# Patient Record
Sex: MALE | Race: ASIAN | Employment: PART TIME | URBAN - METROPOLITAN AREA
[De-identification: names, ages, dates, MRNs, and addresses within clinical notes are randomized per-mention and may not be internally consistent; named-entity substitution may affect disease eponyms.]

---

## 2019-02-07 ENCOUNTER — APPOINTMENT (OUTPATIENT)
Dept: GENERAL RADIOLOGY | Age: 24
End: 2019-02-07
Payer: COMMERCIAL

## 2019-02-07 ENCOUNTER — HOSPITAL ENCOUNTER (EMERGENCY)
Age: 24
Discharge: HOME | End: 2019-02-07
Payer: COMMERCIAL

## 2019-02-07 VITALS
TEMPERATURE: 97.8 F | HEART RATE: 79 BPM | DIASTOLIC BLOOD PRESSURE: 97 MMHG | HEIGHT: 65 IN | WEIGHT: 158 LBS | BODY MASS INDEX: 26.33 KG/M2 | SYSTOLIC BLOOD PRESSURE: 144 MMHG | OXYGEN SATURATION: 97 % | RESPIRATION RATE: 18 BRPM

## 2019-02-07 DIAGNOSIS — S82.301A CLOSED FRACTURE OF DISTAL END OF RIGHT TIBIA, UNSPECIFIED FRACTURE MORPHOLOGY, INITIAL ENCOUNTER: Primary | ICD-10-CM

## 2019-02-07 PROCEDURE — 73610 X-RAY EXAM OF ANKLE: CPT

## 2019-02-07 PROCEDURE — 4500000023 HC ED LEVEL 3 PROCEDURE

## 2019-02-07 PROCEDURE — 99283 EMERGENCY DEPT VISIT LOW MDM: CPT

## 2019-02-07 PROCEDURE — 6370000000 HC RX 637 (ALT 250 FOR IP): Performed by: NURSE PRACTITIONER

## 2019-02-07 RX ORDER — IBUPROFEN 800 MG/1
800 TABLET ORAL EVERY 8 HOURS PRN
Qty: 20 TABLET | Refills: 0 | Status: SHIPPED | OUTPATIENT
Start: 2019-02-07 | End: 2019-08-20

## 2019-02-07 RX ORDER — IBUPROFEN 400 MG/1
400 TABLET ORAL ONCE
Status: COMPLETED | OUTPATIENT
Start: 2019-02-07 | End: 2019-02-07

## 2019-02-07 RX ORDER — HYDROCODONE BITARTRATE AND ACETAMINOPHEN 5; 325 MG/1; MG/1
1 TABLET ORAL EVERY 6 HOURS PRN
Qty: 10 TABLET | Refills: 0 | Status: SHIPPED | OUTPATIENT
Start: 2019-02-07 | End: 2019-02-10

## 2019-02-07 RX ADMIN — IBUPROFEN 400 MG: 400 TABLET, FILM COATED ORAL at 12:59

## 2019-02-07 ASSESSMENT — PAIN DESCRIPTION - LOCATION: LOCATION: ANKLE

## 2019-02-07 ASSESSMENT — PAIN SCALES - GENERAL
PAINLEVEL_OUTOF10: 9
PAINLEVEL_OUTOF10: 8

## 2019-02-07 ASSESSMENT — ENCOUNTER SYMPTOMS
NAUSEA: 0
ABDOMINAL PAIN: 0
CHEST TIGHTNESS: 0
DIARRHEA: 0
VOMITING: 0
SHORTNESS OF BREATH: 0

## 2019-02-07 ASSESSMENT — PAIN DESCRIPTION - ORIENTATION: ORIENTATION: LEFT

## 2019-02-07 ASSESSMENT — PAIN DESCRIPTION - PAIN TYPE: TYPE: ACUTE PAIN

## 2019-02-12 ENCOUNTER — OFFICE VISIT (OUTPATIENT)
Dept: ORTHOPEDIC SURGERY | Age: 24
End: 2019-02-12
Payer: COMMERCIAL

## 2019-02-12 VITALS
BODY MASS INDEX: 26.33 KG/M2 | DIASTOLIC BLOOD PRESSURE: 92 MMHG | SYSTOLIC BLOOD PRESSURE: 153 MMHG | WEIGHT: 158 LBS | HEART RATE: 127 BPM | HEIGHT: 65 IN | RESPIRATION RATE: 16 BRPM

## 2019-02-12 DIAGNOSIS — S82.392A CLOSED FRACTURE OF POSTERIOR MALLEOLUS OF LEFT TIBIA, INITIAL ENCOUNTER: Primary | ICD-10-CM

## 2019-02-12 PROCEDURE — L4360 PNEUMAT WALKING BOOT PRE CST: HCPCS | Performed by: ORTHOPAEDIC SURGERY

## 2019-02-12 PROCEDURE — 99203 OFFICE O/P NEW LOW 30 MIN: CPT | Performed by: ORTHOPAEDIC SURGERY

## 2019-02-12 PROCEDURE — 27824 TREAT LOWER LEG FRACTURE: CPT | Performed by: ORTHOPAEDIC SURGERY

## 2019-02-13 ENCOUNTER — TELEPHONE (OUTPATIENT)
Dept: ORTHOPEDIC SURGERY | Age: 24
End: 2019-02-13

## 2019-03-26 ENCOUNTER — OFFICE VISIT (OUTPATIENT)
Dept: ORTHOPEDIC SURGERY | Age: 24
End: 2019-03-26

## 2019-03-26 VITALS — WEIGHT: 158 LBS | HEIGHT: 65 IN | BODY MASS INDEX: 26.33 KG/M2

## 2019-03-26 DIAGNOSIS — S82.392A CLOSED FRACTURE OF POSTERIOR MALLEOLUS OF LEFT TIBIA, INITIAL ENCOUNTER: Primary | ICD-10-CM

## 2019-03-26 PROCEDURE — APPNB15 APP NON BILLABLE TIME 0-15 MINS: Performed by: NURSE PRACTITIONER

## 2019-03-26 PROCEDURE — 99024 POSTOP FOLLOW-UP VISIT: CPT | Performed by: ORTHOPAEDIC SURGERY

## 2019-06-10 ENCOUNTER — TELEPHONE (OUTPATIENT)
Dept: ORTHOPEDIC SURGERY | Age: 24
End: 2019-06-10

## 2019-06-10 NOTE — TELEPHONE ENCOUNTER
Patient is going back to work Tuesday and would like a work note. Would like to pickup at [facility] on Tuesday morning 6/11/19. Patient can be reached at [phone].

## 2019-06-10 NOTE — LETTER
[facility] [address] [address]  Phone: [phone]  Fax: [phone]    Soraya Cruz MD        Ambika 10, 2019     Patient: Aina Guevara   YOB: 1995   Date of Visit: 6/10/2019       To Whom It May Concern: It is my medical opinion that Ania Guevara may return to work on 06/11/2019. If you have any questions or concerns, please don't hesitate to call.     Sincerely,    Soraya Cruz MD

## 2019-06-18 ENCOUNTER — OFFICE VISIT (OUTPATIENT)
Dept: ORTHOPEDIC SURGERY | Age: 24
End: 2019-06-18
Payer: COMMERCIAL

## 2019-06-18 VITALS
SYSTOLIC BLOOD PRESSURE: 137 MMHG | HEART RATE: 84 BPM | BODY MASS INDEX: 26.33 KG/M2 | RESPIRATION RATE: 16 BRPM | WEIGHT: 158 LBS | DIASTOLIC BLOOD PRESSURE: 89 MMHG | HEIGHT: 65 IN

## 2019-06-18 DIAGNOSIS — S82.392A CLOSED FRACTURE OF POSTERIOR MALLEOLUS OF LEFT TIBIA, INITIAL ENCOUNTER: Primary | ICD-10-CM

## 2019-06-18 PROCEDURE — 1036F TOBACCO NON-USER: CPT | Performed by: ORTHOPAEDIC SURGERY

## 2019-06-18 PROCEDURE — 99213 OFFICE O/P EST LOW 20 MIN: CPT | Performed by: ORTHOPAEDIC SURGERY

## 2019-06-18 PROCEDURE — G8427 DOCREV CUR MEDS BY ELIG CLIN: HCPCS | Performed by: ORTHOPAEDIC SURGERY

## 2019-06-18 PROCEDURE — G8419 CALC BMI OUT NRM PARAM NOF/U: HCPCS | Performed by: ORTHOPAEDIC SURGERY

## 2019-06-18 NOTE — PROGRESS NOTES
CHIEF COMPLAINT: Left ankle pain / posterior tibial fracture. DATE OF INJURY: 2/6/2019, DOT: 2/12/2019    HISTORY:  Shayla Lesch 20 y.o. male presents today for follow up visit for evaluation of a left ankle injury which occurred when he was walking and slipped on the ice down some stairs. He was able to bear weight but with difficulty. He was first seen and evaluated in [facility] ER, where he was x-rayed, splinted and asked to f/u with Orthopedics. He has been weightbearing rates pain a 4/10 VAS only when he bears weight. Otherwise he denies any pain. Rates pain a 0/10 VAS. No numbness or tingling sensation. No other complaint. He works as a . History reviewed. No pertinent past medical history. History reviewed. No pertinent surgical history.     Social History     Socioeconomic History    Marital status: Single     Spouse name: Not on file    Number of children: Not on file    Years of education: Not on file    Highest education level: Not on file   Occupational History    Not on file   Social Needs    Financial resource strain: Not on file    Food insecurity:     Worry: Not on file     Inability: Not on file    Transportation needs:     Medical: Not on file     Non-medical: Not on file   Tobacco Use    Smoking status: Never Smoker    Smokeless tobacco: Never Used   Substance and Sexual Activity    Alcohol use: Yes     Comment: occ    Drug use: No    Sexual activity: Not on file   Lifestyle    Physical activity:     Days per week: Not on file     Minutes per session: Not on file    Stress: Not on file   Relationships    Social connections:     Talks on phone: Not on file     Gets together: Not on file     Attends Yarsanism service: Not on file     Active member of club or organization: Not on file     Attends meetings of clubs or organizations: Not on file     Relationship status: Not on file    Intimate partner violence:     Fear of current or ex partner: Not on file     Emotionally abused: Not on file     Physically abused: Not on file     Forced sexual activity: Not on file   Other Topics Concern    Not on file   Social History Narrative    Not on file       History reviewed. No pertinent family history. Current Outpatient Medications on File Prior to Visit   Medication Sig Dispense Refill    ibuprofen (ADVIL;MOTRIN) 800 MG tablet Take 1 tablet by mouth every 8 hours as needed for Pain or Fever 20 tablet 0    predniSONE (DELTASONE) 10 MG tablet Take 10 mg by mouth      amoxicillin-clavulanate (AUGMENTIN) 875-125 MG per tablet Take 1 tablet by mouth 2 times daily      Phenylephrine-DM-GG (MUCINEX FAST-MAX CONGEST COUGH) 5- MG TABS Take by mouth      albuterol sulfate HFA (PROVENTIL HFA) 108 (90 Base) MCG/ACT inhaler Inhale 2 puffs into the lungs every 4 hours as needed for Wheezing or Shortness of Breath (Space out to every 6 hours as symptoms improve) Space out to every 6 hours as symptoms improve. 1 Inhaler 0     No current facility-administered medications on file prior to visit. Pertinent items are noted in HPI  Review of systems reviewed from Patient History Form dated on 2/12/2019 and available in the patient's chart under the Media tab. No change noted. PHYSICAL EXAMINATION:  Mr. Justino Esparza is a very pleasant 20 y.o. male who presents today in no acute distress, awake, alert, and oriented. He is well dressed, nourished and  groomed. Patient with normal affect. Height is  5' 5\" (1.651 m), weight is 158 lb (71.7 kg), Body mass index is 26.29 kg/m². Resting respiratory rate is 16. Examination of the gait, showed that the patient walks WB left leg with no limp. Examination of both ankles showing a good range of motion of the left ankle compare to the other side. There is no swelling that can be seen, no ecchymosis. He has intact sensation and good pedal pulses.   He has no tenderness on deep palpation over the lateral and posterior left ankle. IMAGING: Xrays, 3 views of the left ankle dated today,  were reviewed, and showed a minimally displaced posterior tibial fracture. IMPRESSION: Left ankle minimally displaced posterior tibial ankle fracture. PLAN:  He can go back to normal activity with no restrictions. I discussed with the patient that I think that he would really benefit from a course of physical therapy for further strengthening and stretching. An Rx for physical therapy was given to the patient. He will be seen in 3 months and will get new Xray at that time. I told the patient that it is not unusual to have some achy pain and swelling for up to a year after a fracture.       Oscar Moss MD

## 2019-06-19 ENCOUNTER — HOSPITAL ENCOUNTER (OUTPATIENT)
Dept: PHYSICAL THERAPY | Age: 24
Setting detail: THERAPIES SERIES
Discharge: HOME | End: 2019-06-19
Payer: COMMERCIAL

## 2019-06-19 PROCEDURE — 97112 NEUROMUSCULAR REEDUCATION: CPT

## 2019-06-19 PROCEDURE — 97161 PT EVAL LOW COMPLEX 20 MIN: CPT

## 2019-06-19 PROCEDURE — 97110 THERAPEUTIC EXERCISES: CPT

## 2019-06-19 NOTE — PLAN OF CARE
deliveries. He comments that his pain is located around the 3-5th metatarsals on the left foot, and ankle pain becomes more intense when he plantarflexes his ankle and great toe simultaneously. Overall, he is getting better. His goal is to get back to normal work tasks and normal activities. Relevant Medical History: HTN, neck surgery 3/13/19  Functional Outcome: LEFS = 54/80 = 33% dis    Pain Scale: 2-5/10  Easing factors: rest  Provocative factors: walking, stairs (down), prolonged standing (10-20 min)    Type: []Constant   [x]Intermittent  []Radiating []Localized []other:     Numbness/Tingling: No    Occupation/School: Delivery    Living Status/Prior Level of Function: Prior to this injury / incident, pt was independent with ADLs and IADLs. OBJECTIVE:   Posture: Fair    Functional Mobility/Transfers: Independent    Inspection: patient able to toe walk and heel walk; toe walk increases pain in 3-5th metatarsals on L foot    Palpation: +1 TTP 3rd-5th metatarsals, dorsal aspect    Bandages/Dressings/Incisions: N/A    Gait: (include devices/WB status): normalized gait; increased toe out bilaterally.  Patient comments he feels pain in left mid/forefoot during midstance and push-off    Dermatomes Normal Abnormal Comments   inguinal area (L1)  X     anterior mid-thigh (L2) X     distal ant thigh/med knee (L3) X     medial lower leg and foot (L4) X     lateral lower leg and foot (L5) X     posterior calf (S1) X     medial calcaneus (S2) X         Myotomes Normal Abnormal Comments   Hip flexion (L1-L2) X     Knee extension (L2-L4) X     Dorsiflexion (L4-L5) X     Great Toe Ext (L5) X     Ankle Eversion (S1-S2) X     Ankle PF(S1-S2) X         Reflexes Normal Abnormal Comments   S1-2 Seated achilles      S1-2 Prone knee bend      L3-4 Patellar tendon      Clonus      Babinski           PROM AROM    L R L R   Hip Flexion       Hip Abduction       Hip ER       Hip IR       Knee Flexion   135 134   Knee Extension   0 0 Dorsiflexion (long sitting)    Prone DF   14      10 20      15   Plantarflexion    35 44   Inversion    35 40   Eversion    15 22       Strength (0-5) Left Right   Hip Flexion - supine     Hip Flexion - seated 4+ 4+   Hip Abduction     Hip Adduction     Hip Extension     Hip ER 4 4+   Hip IR 4 4+   Quads 4+ 4+   Hamstrings 4+ 4+   Ankle Dorsiflexion 5 5   Ankle Plantarflexion 4 4+   Ankle Inversion 4 4+   Ankle Eversion 4+ 4+        Flexibility     Hamstrings (90/90)     ITB Mardeen Hun)     Quads (Ely's)     Hip Flexor Maryjoshua Smith)          Girth (cm)     Mid patella     Suprapatellar     Figure 8     Transmalleolar     Metatarsal Heads         Orthopaedic Special Tests  Positive  Negative  NT Comments    Hip       PIPO / Rishabh's       FADIR       Scour              Knee       Lachman's / Anterior Drawer       Posterior Drawer       Varus Stress       Valgus Stress       Jed's        Appley's       Thessaly's              Ankle       Anterior Drawer  X     Talar Tilt       Seymour       Dank's   X              Joint mobility: Ankle mortise   [x]Normal    []Hypo   []Hyper    Balance: NBOS = 10sec, Tandem R/L each = 10sec, SLS R = 10sec, SLS L = 4sec (unsteadiness)                       [x] Patient history, allergies, meds reviewed. Medical chart reviewed. See intake form. Review Of Systems (ROS):  [x]Performed Review of systems (Integumentary, CardioPulmonary, Neurological) by intake and observation. Intake form has been scanned into medical record. Patient has been instructed to contact their primary care physician regarding ROS issues if not already being addressed at this time.       Co-morbidities/Complexities (which will affect course of rehabilitation):   []None           Arthritic conditions   []Rheumatoid arthritis (M05.9)  []Osteoarthritis (M19.91)   Cardiovascular conditions   []Hypertension (I10)  []Hyperlipidemia (E78.5)  []Angina pectoris (I20)  []Atherosclerosis (I70)   Musculoskeletal conditions []Disc pathology   []Congenital spine pathologies   []Prior surgical intervention  []Osteoporosis (M81.8)  []Osteopenia (M85.8)   Endocrine conditions   []Hypothyroid (E03.9)  []Hyperthyroid Gastrointestinal conditions   []Constipation (K00.85)   Metabolic conditions   []Morbid obesity (E66.01)  []Diabetes type 1(E10.65) or 2 (E11.65)   []Neuropathy (G60.9)     Pulmonary conditions   []Asthma (J45)  []Coughing   []COPD (J44.9)   Psychological Disorders  []Anxiety (F41.9)  []Depression (F32.9)   []Other:   []Other:          Barriers to/and or personal factors that will affect rehab potential:              [x]Age  [x]Sex    []Smoker              [x]Motivation/Lack of Motivation                        []Co-Morbidities              []Cognitive Function, education/learning barriers              []Environmental, home barriers              []profession/work barriers  []past PT/medical experience  []other:  Justification: positive influence on rehab    Falls Risk Assessment (30 days): despite SANDRA,  [x] Falls Risk assessed and no intervention required    ASSESSMENT:   Functional Impairments:     [x]Noted lumbar/distal hip/LE joint hypomobility   [x]Decreased LE functional ROM   [x]Decreased core/distal hip strength and neuromuscular control   [x]Decreased LE functional strength   [x]Reduced balance/proprioceptive control   []other:      Functional Activity Limitations (from functional questionnaire and intake)   [x]Reduced ability to tolerate prolonged functional positions   []Reduced ability or difficulty with changes of positions or transfers between positions   []Reduced ability to maintain good posture and demonstrate good body mechanics with sitting, bending, and lifting   []Reduced ability to sleep   [] Reduced ability or tolerance with driving and/or computer work   []Reduced ability to perform lifting, carrying tasks   []Reduced ability to squat   []Reduced ability to forward bend   [x]Reduced ability to ambulate prolonged functional periods/distances/surfaces   [x]Reduced ability to ascend/descend stairs   [x]Reduced ability to run, hop, cut or jump   []other:    Participation Restrictions   []Reduced participation in self care activities   [x]Reduced participation in home management activities   [x]Reduced participation in work activities   [x]Reduced participation in social activities. [x]Reduced participation in sport/recreation activities. Classification :    []Signs/symptoms consistent with post-surgical status including decreased ROM, strength and function.    [x]Signs/symptoms consistent with joint sprain/strain/closed fracture   []Signs/symptoms consistent with patella-femoral syndrome   []Signs/symptoms consistent with knee OA/hip OA   []Signs/symptoms consistent with internal derangement of knee/Hip   []Signs/symptoms consistent with functional hip weakness/NMR control      []Signs/symptoms consistent with tendinitis/tendinosis    []signs/symptoms consistent with pathology which may benefit from Dry needling      []other:      Prognosis/Rehab Potential:      [x]Excellent   []Good    []Fair   []Poor    Tolerance of evaluation/treatment:    [x]Excellent   []Good    []Fair   []Poor    Physical Therapy Evaluation Complexity Justification  [x] A history of present problem with:  [] no personal factors and/or comorbidities that impact the plan of care;  [x]1-2 personal factors and/or comorbidities that impact the plan of care  []3 personal factors and/or comorbidities that impact the plan of care  [x] An examination of body systems using standardized tests and measures addressing any of the following: body structures and functions (impairments), activity limitations, and/or participation restrictions;:  [] a total of 1-2 or more elements   [x] a total of 3 or more elements   [] a total of 4 or more elements   [x] A clinical presentation with:  [x] stable and/or uncomplicated characteristics   [] evolving clinical presentation with changing characteristics  [] unstable and unpredictable characteristics;   [x] Clinical decision making of [x] low , [] moderate, [] high complexity using standardized patient assessment instrument and/or measurable assessment of functional outcome. [x] EVAL (LOW) 47527 (typically 15 minutes face-to-face)  [] EVAL (MOD) 29913 (typically 30 minutes face-to-face)  [] EVAL (HIGH) 99183 (typically 45 minutes face-to-face)  [] RE-EVAL     PLAN:   Frequency/Duration:  2 days per week for 4 Weeks:  Interventions:  [x]  Therapeutic exercise including: strength training, ROM, for Lower extremity and core   [x]  NMR activation and proprioception for LE, Glutes and Core   [x]  Manual therapy as indicated for LE, Hip and spine to include: Dry Needling/IASTM, STM, PROM, Gr I-IV mobilizations, manipulation. [x] Modalities as needed that may include: thermal agents, E-stim, Biofeedback, US, iontophoresis as indicated  [x] Patient education on joint protection, postural re-education, activity modification, progression of HEP. HEP instruction: The following exercises were performed and added to the patient's home exercise program. (ankle 4 way w/ TB, standing runner's S, standing soleus S, standing HR, toe curls, SLS LLE). Additionally, the patient was educated on appropriate frequency, intensity and duration. Handout provided    GOALS:  Patient stated goal: to return to normalized walking, driving, work tasks    Therapist goals for Patient:   Short Term Goals: To be achieved in: 2 weeks  1. Independent in HEP and progression per patient tolerance, in order to prevent re-injury. 2. Patient will have a decrease in pain to <1/10 facilitate improvement in movement, function, and ADLs as indicated by Functional Deficits. Long Term Goals: To be achieved in: 4 weeks  1. Disability index score of 15% or less for the LEFS to assist with reaching prior level of function.    2. Patient will demonstrate increased AROM to 15deg prone, 20deg long sitting to allow for proper joint functioning as indicated by patients Functional Deficits. 3. Patient will demonstrate an increase in ankle strength to 5/5 as well as control to allow for proper functional mobility as indicated by patients Functional Deficits. 4. Patient will return to functional activities including stair navigation without increased symptoms or restriction. 5. The patient will perform 15 squats >60deg in depth with proper form and control in order to facilitate return to PLOF in functional mobility.     Electronically signed by:  Mitali Engle, PT, DPT

## 2019-06-19 NOTE — FLOWSHEET NOTE
ambulation. [x] (24489) Provided verbal/tactile cueing for activities related to improving balance, coordination, kinesthetic sense, posture, motor skill, proprioception  to assist with LE, proximal hip, and core control in self care, mobility, lifting, ambulation and eccentric single leg control. NMR and Therapeutic Activities:    [x] (15071 or 70913) Provided verbal/tactile cueing for activities related to improving balance, coordination, kinesthetic sense, posture, motor skill, proprioception and motor activation to allow for proper function of core, proximal hip and LE with self care and ADLs  [] (86488) Gait Re-education- Provided training and instruction to the patient for proper LE, core and proximal hip recruitment and positioning and eccentric body weight control with ambulation re-education including up and down stairs     Home Exercise Program:    [x] (74652) Reviewed/Progressed HEP activities related to strengthening, flexibility, endurance, ROM of core, proximal hip and LE for functional self-care, mobility, lifting and ambulation/stair navigation   [] (02134)Reviewed/Progressed HEP activities related to improving balance, coordination, kinesthetic sense, posture, motor skill, proprioception of core, proximal hip and LE for self care, mobility, lifting, and ambulation/stair navigation      Manual Treatments:  PROM / STM / Oscillations-Mobs:  G-I, II, III, IV (PA's, Inf., Post.)  [] (15764) Provided manual therapy to mobilize LE, proximal hip and/or LS spine soft tissue/joints for the purpose of modulating pain, promoting relaxation,  increasing ROM, reducing/eliminating soft tissue swelling/inflammation/restriction, improving soft tissue extensibility and allowing for proper ROM for normal function with self care, mobility, lifting and ambulation.      Modalities:  [] (60825) Vasopneumatic compression: Utilized vasopneumatic compression to decrease edema / swelling for the purpose of improving mobility and quad tone / recruitment which will allow for increased overall function including but not limited to self-care, transfers, ambulation, and ascending / descending stairs. Modalities:  [place] [facility] [place]    Charges:  Timed Code Treatment Minutes: 30   Total Treatment Minutes: 50     [x] EVAL - LOW (86708)   [] EVAL - MOD (75789)  [] EVAL - HIGH (54522)  [] RE-EVAL (19954)  [x] JX(22064) x 1      [] Ionto  [x] NMR (83607) x 1    [] Vaso  [] Manual (12245) x      [] Ultrasound  [] TA x       [] Mech Traction (90431)  [] Aquatic Therapy x     [] ES (un) (85791):   [] Home Management Training x [] ES(attended) (92445)   [] Group:     [] Other:     GOALS: Patient stated goal: to return to normalized walking, driving, work tasks    Therapist goals for Patient:   Short Term Goals: To be achieved in: 2 weeks  1. Independent in HEP and progression per patient tolerance, in order to prevent re-injury. 2. Patient will have a decrease in pain to <1/10 facilitate improvement in movement, function, and ADLs as indicated by Functional Deficits. Long Term Goals: To be achieved in: 4 weeks  1. Disability index score of 15% or less for the LEFS to assist with reaching prior level of function. 2. Patient will demonstrate increased AROM to 15deg prone, 20deg long sitting to allow for proper joint functioning as indicated by patients Functional Deficits. 3. Patient will demonstrate an increase in ankle strength to 5/5 as well as control to allow for proper functional mobility as indicated by patients Functional Deficits. 4. Patient will return to functional activities including stair navigation without increased symptoms or restriction. 5. The patient will perform 15 squats >60deg in depth with proper form and control in order to facilitate return to PLOF in functional mobility. Progression Towards Functional goals:  [] Patient is progressing as expected towards functional goals listed.     []

## 2019-06-26 ENCOUNTER — APPOINTMENT (OUTPATIENT)
Dept: PHYSICAL THERAPY | Age: 24
End: 2019-06-26
Payer: COMMERCIAL

## 2019-06-28 ENCOUNTER — HOSPITAL ENCOUNTER (OUTPATIENT)
Dept: PHYSICAL THERAPY | Age: 24
Setting detail: THERAPIES SERIES
Discharge: HOME | End: 2019-06-28
Payer: COMMERCIAL

## 2019-06-28 PROCEDURE — 97110 THERAPEUTIC EXERCISES: CPT

## 2019-06-28 PROCEDURE — 97140 MANUAL THERAPY 1/> REGIONS: CPT

## 2019-06-28 NOTE — FLOWSHEET NOTE
Post.)  [x] (22601) Provided manual therapy to mobilize LE, proximal hip and/or LS spine soft tissue/joints for the purpose of modulating pain, promoting relaxation,  increasing ROM, reducing/eliminating soft tissue swelling/inflammation/restriction, improving soft tissue extensibility and allowing for proper ROM for normal function with self care, mobility, lifting and ambulation. Modalities:  [] (33617) Vasopneumatic compression: Utilized vasopneumatic compression to decrease edema / swelling for the purpose of improving mobility and quad tone / recruitment which will allow for increased overall function including but not limited to self-care, transfers, ambulation, and ascending / descending stairs. Modalities:  [place] [facility] [place]    Charges:  Timed Code Treatment Minutes: 30   Total Treatment Minutes: 30     [] EVAL - LOW (47187)   [] EVAL - MOD (74394)  [] EVAL - HIGH (01694)  [] RE-EVAL (86890)  [x] WI(66374) x 1      [] Ionto  [] NMR (35066) x     [] Vaso  [x] Manual (63594) x1      [] Ultrasound  [] TA x       [] Mech Traction (37253)  [] Aquatic Therapy x     [] ES (un) (78142):   [] Home Management Training x [] ES(attended) (74196)   [] Group:     [] Other:     GOALS: Patient stated goal: to return to normalized walking, driving, work tasks    Therapist goals for Patient:   Short Term Goals: To be achieved in: 2 weeks  1. Independent in HEP and progression per patient tolerance, in order to prevent re-injury. 2. Patient will have a decrease in pain to <1/10 facilitate improvement in movement, function, and ADLs as indicated by Functional Deficits. Long Term Goals: To be achieved in: 4 weeks  1. Disability index score of 15% or less for the LEFS to assist with reaching prior level of function. 2. Patient will demonstrate increased AROM to 15deg prone, 20deg long sitting to allow for proper joint functioning as indicated by patients Functional Deficits.    3. Patient will demonstrate an increase in ankle strength to 5/5 as well as control to allow for proper functional mobility as indicated by patients Functional Deficits. 4. Patient will return to functional activities including stair navigation without increased symptoms or restriction. 5. The patient will perform 15 squats >60deg in depth with proper form and control in order to facilitate return to PLOF in functional mobility. Progression Towards Functional goals:  [] Patient is progressing as expected towards functional goals listed. [] Progression is slowed due to complexities listed. [] Progression has been slowed due to co-morbidities. [x] Plan just implemented, too soon to assess goals progression  [] Other:     Persisting Functional Limitations/Impairments:  []Sitting [x]Standing   [x]Walking [x]Stairs   []Transfers []ADLs   [x]Squatting/bending []Kneeling  []Housework [x]Job related tasks  []Driving [x]Sports/Recreation   []Sleeping []Other:    ASSESSMENT:  Good toleration with PT, overall good reduction of symptoms with all interventions and with good carryover. No reports of foot pain after manual techniques and with interventions, mild reports of lateral ankle pain with fatigue however eliminated with brief rest break. Progressing well towards goals quickly. Will benefit from further PT interventions to work to progress SL strength, balance and functional activity tolerance. Treatment/Activity Tolerance:  [x] Patient tolerated treatment well [] Patient limited by fatique  [] Patient limited by pain  [] Patient limited by other medical complications  [] Other:     Prognosis:  [x] Good [] Fair  [] Poor    Patient Requires Follow-up: [x] Yes  [] No    PLAN: PT 2x / week for 4 weeks.  PROGRESSION OF BALANCE, ANKLE STABILITY EXERCISES (AIREX, SLS, STEPS)  [x] Continue per plan of care [] Alter current plan (see comments)  [] Plan of care initiated [] Hold pending MD visit [] Discharge    Electronically signed by: Nessa Herrera Yariel Pierce PT, DPT

## 2019-07-02 ENCOUNTER — HOSPITAL ENCOUNTER (OUTPATIENT)
Dept: PHYSICAL THERAPY | Age: 24
Setting detail: THERAPIES SERIES
Discharge: HOME | End: 2019-07-02
Payer: COMMERCIAL

## 2019-07-03 ENCOUNTER — HOSPITAL ENCOUNTER (OUTPATIENT)
Dept: PHYSICAL THERAPY | Age: 24
Setting detail: THERAPIES SERIES
Discharge: HOME | End: 2019-07-03
Payer: COMMERCIAL

## 2019-07-03 PROCEDURE — 97112 NEUROMUSCULAR REEDUCATION: CPT

## 2019-07-03 PROCEDURE — 97110 THERAPEUTIC EXERCISES: CPT

## 2019-08-20 ENCOUNTER — OFFICE VISIT (OUTPATIENT)
Dept: FAMILY MEDICINE CLINIC | Age: 24
End: 2019-08-20
Payer: COMMERCIAL

## 2019-08-20 VITALS
OXYGEN SATURATION: 99 % | BODY MASS INDEX: 27.49 KG/M2 | DIASTOLIC BLOOD PRESSURE: 92 MMHG | WEIGHT: 165 LBS | HEIGHT: 65 IN | HEART RATE: 82 BPM | SYSTOLIC BLOOD PRESSURE: 122 MMHG

## 2019-08-20 DIAGNOSIS — Z00.00 ANNUAL PHYSICAL EXAM: ICD-10-CM

## 2019-08-20 DIAGNOSIS — Q89.2 THYROGLOSSAL DUCT CYST: ICD-10-CM

## 2019-08-20 DIAGNOSIS — Z00.00 ANNUAL PHYSICAL EXAM: Primary | ICD-10-CM

## 2019-08-20 DIAGNOSIS — R22.1 NECK FULLNESS: ICD-10-CM

## 2019-08-20 PROCEDURE — 99385 PREV VISIT NEW AGE 18-39: CPT | Performed by: FAMILY MEDICINE

## 2019-08-20 RX ORDER — OMEPRAZOLE 20 MG/1
20 CAPSULE, DELAYED RELEASE ORAL DAILY
COMMUNITY
End: 2019-09-24

## 2019-08-20 ASSESSMENT — PATIENT HEALTH QUESTIONNAIRE - PHQ9
2. FEELING DOWN, DEPRESSED OR HOPELESS: 0
SUM OF ALL RESPONSES TO PHQ QUESTIONS 1-9: 0
SUM OF ALL RESPONSES TO PHQ9 QUESTIONS 1 & 2: 0
SUM OF ALL RESPONSES TO PHQ QUESTIONS 1-9: 0
1. LITTLE INTEREST OR PLEASURE IN DOING THINGS: 0

## 2019-08-21 LAB
A/G RATIO: 1.7 (ref 1.1–2.2)
ALBUMIN SERPL-MCNC: 4.4 G/DL (ref 3.4–5)
ALP BLD-CCNC: 84 U/L (ref 40–129)
ALT SERPL-CCNC: 46 U/L (ref 10–40)
ANION GAP SERPL CALCULATED.3IONS-SCNC: 10 MMOL/L (ref 3–16)
AST SERPL-CCNC: 27 U/L (ref 15–37)
BANDED NEUTROPHILS RELATIVE PERCENT: 2 % (ref 0–7)
BASOPHILS ABSOLUTE: 0 K/UL (ref 0–0.2)
BASOPHILS RELATIVE PERCENT: 0 %
BILIRUB SERPL-MCNC: 0.6 MG/DL (ref 0–1)
BUN BLDV-MCNC: 10 MG/DL (ref 7–20)
CALCIUM SERPL-MCNC: 9.4 MG/DL (ref 8.3–10.6)
CHLORIDE BLD-SCNC: 102 MMOL/L (ref 99–110)
CHOLESTEROL, FASTING: 194 MG/DL (ref 0–199)
CO2: 27 MMOL/L (ref 21–32)
CREAT SERPL-MCNC: 0.9 MG/DL (ref 0.9–1.3)
EOSINOPHILS ABSOLUTE: 0.1 K/UL (ref 0–0.6)
EOSINOPHILS RELATIVE PERCENT: 2 %
ESTIMATED AVERAGE GLUCOSE: 105.4 MG/DL
GFR AFRICAN AMERICAN: >60
GFR NON-AFRICAN AMERICAN: >60
GLOBULIN: 2.6 G/DL
GLUCOSE BLD-MCNC: 98 MG/DL (ref 70–99)
HBA1C MFR BLD: 5.3 %
HCT VFR BLD CALC: 45.7 % (ref 40.5–52.5)
HDLC SERPL-MCNC: 37 MG/DL (ref 40–60)
HEMOGLOBIN: 15.4 G/DL (ref 13.5–17.5)
LDL CHOLESTEROL CALCULATED: 111 MG/DL
LYMPHOCYTES ABSOLUTE: 3.1 K/UL (ref 1–5.1)
LYMPHOCYTES RELATIVE PERCENT: 43 %
MCH RBC QN AUTO: 30.5 PG (ref 26–34)
MCHC RBC AUTO-ENTMCNC: 33.6 G/DL (ref 31–36)
MCV RBC AUTO: 90.7 FL (ref 80–100)
MONOCYTES ABSOLUTE: 0.4 K/UL (ref 0–1.3)
MONOCYTES RELATIVE PERCENT: 6 %
NEUTROPHILS ABSOLUTE: 3.5 K/UL (ref 1.7–7.7)
NEUTROPHILS RELATIVE PERCENT: 47 %
PDW BLD-RTO: 13.8 % (ref 12.4–15.4)
PLATELET # BLD: 253 K/UL (ref 135–450)
PMV BLD AUTO: 9.8 FL (ref 5–10.5)
POTASSIUM SERPL-SCNC: 4.5 MMOL/L (ref 3.5–5.1)
RBC # BLD: 5.05 M/UL (ref 4.2–5.9)
SLIDE REVIEW: NORMAL
SODIUM BLD-SCNC: 139 MMOL/L (ref 136–145)
TOTAL PROTEIN: 7 G/DL (ref 6.4–8.2)
TRIGLYCERIDE, FASTING: 228 MG/DL (ref 0–150)
TSH REFLEX: 2.71 UIU/ML (ref 0.27–4.2)
VLDLC SERPL CALC-MCNC: 46 MG/DL
WBC # BLD: 7.2 K/UL (ref 4–11)

## 2019-09-24 ENCOUNTER — OFFICE VISIT (OUTPATIENT)
Dept: ENT CLINIC | Age: 24
End: 2019-09-24
Payer: COMMERCIAL

## 2019-09-24 VITALS
HEIGHT: 66 IN | HEART RATE: 67 BPM | SYSTOLIC BLOOD PRESSURE: 129 MMHG | BODY MASS INDEX: 25.81 KG/M2 | WEIGHT: 160.6 LBS | DIASTOLIC BLOOD PRESSURE: 87 MMHG

## 2019-09-24 DIAGNOSIS — R09.89 THROAT FULLNESS: ICD-10-CM

## 2019-09-24 DIAGNOSIS — R09.89 GLOBUS PHARYNGEUS: Primary | Chronic | ICD-10-CM

## 2019-09-24 DIAGNOSIS — R09.89 CHRONIC THROAT CLEARING: Chronic | ICD-10-CM

## 2019-09-24 PROCEDURE — 1036F TOBACCO NON-USER: CPT | Performed by: OTOLARYNGOLOGY

## 2019-09-24 PROCEDURE — G8419 CALC BMI OUT NRM PARAM NOF/U: HCPCS | Performed by: OTOLARYNGOLOGY

## 2019-09-24 PROCEDURE — G8427 DOCREV CUR MEDS BY ELIG CLIN: HCPCS | Performed by: OTOLARYNGOLOGY

## 2019-09-24 PROCEDURE — 99204 OFFICE O/P NEW MOD 45 MIN: CPT | Performed by: OTOLARYNGOLOGY

## 2019-10-30 ENCOUNTER — OFFICE VISIT (OUTPATIENT)
Dept: ENT CLINIC | Age: 24
End: 2019-10-30
Payer: COMMERCIAL

## 2019-10-30 VITALS
DIASTOLIC BLOOD PRESSURE: 106 MMHG | WEIGHT: 165 LBS | BODY MASS INDEX: 26.52 KG/M2 | SYSTOLIC BLOOD PRESSURE: 145 MMHG | HEART RATE: 87 BPM | HEIGHT: 66 IN

## 2019-10-30 DIAGNOSIS — R09.89 GLOBUS PHARYNGEUS: ICD-10-CM

## 2019-10-30 DIAGNOSIS — K21.9 LPRD (LARYNGOPHARYNGEAL REFLUX DISEASE): Primary | ICD-10-CM

## 2019-10-30 DIAGNOSIS — J37.0 CHRONIC LARYNGITIS: ICD-10-CM

## 2019-10-30 PROCEDURE — 31575 DIAGNOSTIC LARYNGOSCOPY: CPT | Performed by: OTOLARYNGOLOGY

## 2019-10-30 RX ORDER — OMEPRAZOLE 20 MG/1
CAPSULE, DELAYED RELEASE ORAL
Qty: 180 CAPSULE | Refills: 0 | Status: SHIPPED | OUTPATIENT
Start: 2019-10-30 | End: 2020-02-03 | Stop reason: SDUPTHER

## 2020-02-03 ENCOUNTER — OFFICE VISIT (OUTPATIENT)
Dept: ENT CLINIC | Age: 25
End: 2020-02-03
Payer: COMMERCIAL

## 2020-02-03 VITALS
HEART RATE: 75 BPM | SYSTOLIC BLOOD PRESSURE: 129 MMHG | BODY MASS INDEX: 25.39 KG/M2 | DIASTOLIC BLOOD PRESSURE: 86 MMHG | WEIGHT: 158 LBS | HEIGHT: 66 IN

## 2020-02-03 PROCEDURE — 31575 DIAGNOSTIC LARYNGOSCOPY: CPT | Performed by: OTOLARYNGOLOGY

## 2020-02-03 RX ORDER — OMEPRAZOLE 20 MG/1
CAPSULE, DELAYED RELEASE ORAL
Qty: 180 CAPSULE | Refills: 1 | Status: SHIPPED | OUTPATIENT
Start: 2020-02-03 | End: 2020-06-03 | Stop reason: SDUPTHER

## 2020-02-04 ENCOUNTER — HOSPITAL ENCOUNTER (EMERGENCY)
Age: 25
Discharge: HOME | End: 2020-02-04

## 2020-02-04 VITALS
OXYGEN SATURATION: 99 % | RESPIRATION RATE: 16 BRPM | HEART RATE: 74 BPM | SYSTOLIC BLOOD PRESSURE: 149 MMHG | DIASTOLIC BLOOD PRESSURE: 90 MMHG | TEMPERATURE: 98.1 F | BODY MASS INDEX: 25.39 KG/M2 | WEIGHT: 158 LBS | HEIGHT: 66 IN

## 2020-02-04 PROCEDURE — 99282 EMERGENCY DEPT VISIT SF MDM: CPT

## 2020-02-04 RX ORDER — CEPHALEXIN 500 MG/1
500 CAPSULE ORAL 4 TIMES DAILY
Qty: 40 CAPSULE | Refills: 0 | Status: SHIPPED | OUTPATIENT
Start: 2020-02-04 | End: 2020-02-14

## 2020-02-04 RX ORDER — SULFAMETHOXAZOLE AND TRIMETHOPRIM 800; 160 MG/1; MG/1
1 TABLET ORAL 2 TIMES DAILY
Qty: 20 TABLET | Refills: 0 | Status: SHIPPED | OUTPATIENT
Start: 2020-02-04 | End: 2020-02-14

## 2020-02-04 ASSESSMENT — ENCOUNTER SYMPTOMS
WHEEZING: 0
RHINORRHEA: 0
DIARRHEA: 0
NAUSEA: 0
VOMITING: 0
SHORTNESS OF BREATH: 0
COLOR CHANGE: 1
ABDOMINAL PAIN: 0
COUGH: 0

## 2020-02-04 ASSESSMENT — PAIN SCALES - GENERAL: PAINLEVEL_OUTOF10: 6

## 2020-02-04 NOTE — ED PROVIDER NOTES
[address] [facility]        Pt Name: Marta Quiros  MRN: [id number]  [place] 1995  Date of evaluation: 2/4/2020  Provider: Sandy Rodriguez PA-C  PCP: Milka Valles MD    This patient was not seen and evaluated by the attending physician No att. providers found. CHIEF COMPLAINT       Chief Complaint   Patient presents with    Head Injury     Patient in with complaints of getting hit in back of head at construction site years ago and has pain today and for a few days. States it has a red bump        HISTORY OF PRESENT ILLNESS   (Location/Symptom, Timing/Onset, Context/Setting, Quality, Duration, Modifying Factors, Severity)  Note limiting factors. Marta Quiros is a 30 y.o. male who presents for evaluation of painful bump to the back of his head over the past couple of days. He denies any drainage from this area. He states that he is concerned this might be related to an old head injury that occurred several years ago. He denies any fevers or chills. No abdominal pain, nausea vomiting. No dizziness/lightheadedness, weakness, visual disturbances or syncope. No neck pain or stiffness. Not the worst headache of his life. He has no other complaints or concerns at this time. Nursing Notes were all reviewed and agreed with or any disagreements were addressed in the HPI. REVIEW OF SYSTEMS    (2-9 systems for level 4, 10 or more for level 5)     Review of Systems   Constitutional: Negative for appetite change, chills and fever. HENT: Negative for congestion and rhinorrhea. Eyes: Negative for visual disturbance. Respiratory: Negative for cough, shortness of breath and wheezing. Cardiovascular: Negative for chest pain. Gastrointestinal: Negative for abdominal pain, diarrhea, nausea and vomiting. Genitourinary: Negative for difficulty urinating, dysuria and hematuria.    Musculoskeletal: Negative for neck pain and neck stiffness. Skin: Positive for color change. Negative for rash. Neurological: Positive for headaches. Negative for dizziness, syncope and light-headedness. Positives and Pertinent negatives as per HPI. Except as noted above in the ROS, all other systems were reviewed and negative. 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 Kyle Cisneros at [facility]         SURGICAL HISTORY     Past Surgical History:   Procedure Laterality Date    NECK SURGERY  04/2019         CURRENTMEDICATIONS       Previous Medications    OMEPRAZOLE (PRILOSEC) 20 MG DELAYED RELEASE CAPSULE    Take 1 capsule by mouth 2 times daily; take on an empty stomach and eat a meal or snack 45 to 60 minutes hour after each dose. ALLERGIES     Patient has no known allergies. FAMILYHISTORY     History reviewed. No pertinent family history. SOCIAL HISTORY       Social History     Tobacco Use    Smoking status: Never Smoker    Smokeless tobacco: Never Used   Substance Use Topics    Alcohol use: Yes     Comment: 1 Beer per day    Drug use: No       SCREENINGS             PHYSICAL EXAM    (up to 7 for level 4, 8 or more for level 5)     ED Triage Vitals [02/04/20 1438]   BP Temp Temp Source Pulse Resp SpO2 Height Weight   (!) 149/90 98.1 °F (36.7 °C) Oral 74 16 99 % 5' 6\" (1.676 m) 158 lb (71.7 kg)       Physical Exam  Vitals signs and nursing note reviewed. Constitutional:       Appearance: He is well-developed. He is not diaphoretic. HENT:      Head: Normocephalic and atraumatic. Right Ear: External ear normal.      Left Ear: External ear normal.      Nose: Nose normal.   Eyes:      General:         Right eye: No discharge. Left eye: No discharge. Neck:      Musculoskeletal: Normal range of motion and neck supple. Cardiovascular:      Rate and Rhythm: Normal rate and regular rhythm. Heart sounds: Normal heart sounds. 16   Temp: 98.1 °F (36.7 °C)   TempSrc: Oral   SpO2: 99%   Weight: 158 lb (71.7 kg)   Height: 5' 6\" (1.676 m)       Patient was given thefollowing medications:  Medications - No data to display    Patient presents for evaluation of painful bump to his scalp. On exam, he is sitting comfortably in bed no acute distress nontoxic. He is slightly hypertensive but vitals otherwise stable and he is afebrile. Lungs are clear to auscultation bilaterally. Abdomen is benign. Patient has 2 cm area of tenderness erythema and fluctuance to crown of the scalp as illustrated above with central head. No active drainage. No significant induration or surrounding erythema concerning for associated cellulitis. Area was drained per procedure note patient tolerated that difficulty. Symptomatic, supportive and wound care was discussed including continued warm soaks and compresses and is given prescriptions for Keflex and Bactrim. You should be reevaluated in 1 week to ensure improvement was also informed that given my suspicion for sebaceous cyst, the lesion may return and he is to follow-up with general surgery for definitive treatment as needed. The patient is otherwise not clinically toxic nor febrile. Differential diagnosis includes but is not limited to erysipelas, osteomyelitis, septic arthritis, lymphadenitis, DVT and necrotizing fasciitis. The appearance of the infection is such that I believe he will improve with oral antibiotics. He was advised to follow-up with their family doctor within the next 24-48 hours and return to the ED if there is no improvement or if the infection starts to worsen in any way. He is agreeable to this plan and stable for discharge at this time. FINAL IMPRESSION      1.  Infected sebaceous cyst          DISPOSITION/PLAN   DISPOSITION Decision To Discharge 02/04/2020 04:08:38 PM      PATIENT REFERREDTO:  [facility]  [address] [facility] [address]  [phone]    Call

## 2020-02-05 ENCOUNTER — OFFICE VISIT (OUTPATIENT)
Dept: FAMILY MEDICINE CLINIC | Age: 25
End: 2020-02-05
Payer: COMMERCIAL

## 2020-02-05 VITALS
SYSTOLIC BLOOD PRESSURE: 138 MMHG | HEART RATE: 76 BPM | OXYGEN SATURATION: 99 % | WEIGHT: 155 LBS | DIASTOLIC BLOOD PRESSURE: 90 MMHG | BODY MASS INDEX: 25.02 KG/M2

## 2020-02-05 PROCEDURE — 99213 OFFICE O/P EST LOW 20 MIN: CPT | Performed by: FAMILY MEDICINE

## 2020-02-05 RX ORDER — LISINOPRIL 10 MG/1
10 TABLET ORAL DAILY
Qty: 30 TABLET | Refills: 5 | Status: SHIPPED | OUTPATIENT
Start: 2020-02-05 | End: 2020-06-03

## 2020-02-05 ASSESSMENT — PATIENT HEALTH QUESTIONNAIRE - PHQ9
2. FEELING DOWN, DEPRESSED OR HOPELESS: 0
SUM OF ALL RESPONSES TO PHQ QUESTIONS 1-9: 0
SUM OF ALL RESPONSES TO PHQ QUESTIONS 1-9: 0
1. LITTLE INTEREST OR PLEASURE IN DOING THINGS: 0
SUM OF ALL RESPONSES TO PHQ9 QUESTIONS 1 & 2: 0

## 2020-02-05 NOTE — PATIENT INSTRUCTIONS
serving is 1 slice of bread, 1 ounce of dry cereal, or ½ cup of cooked rice, pasta, or cooked cereal. Try to choose whole-grain products as much as possible. · Limit lean meat, poultry, and fish to 2 servings each day. A serving is 3 ounces, about the size of a deck of cards. · Eat 4 to 5 servings of nuts, seeds, and legumes (cooked dried beans, lentils, and split peas) each week. A serving is 1/3 cup of nuts, 2 tablespoons of seeds, or ½ cup of cooked beans or peas. · Limit fats and oils to 2 to 3 servings each day. A serving is 1 teaspoon of vegetable oil or 2 tablespoons of salad dressing. · Limit sweets and added sugars to 5 servings or less a week. A serving is 1 tablespoon jelly or jam, ½ cup sorbet, or 1 cup of lemonade. · Eat less than 2,300 milligrams (mg) of sodium a day. If you limit your sodium to 1,500 mg a day, you can lower your blood pressure even more. Tips for success  · Start small. Do not try to make dramatic changes to your diet all at once. You might feel that you are missing out on your favorite foods and then be more likely to not follow the plan. Make small changes, and stick with them. Once those changes become habit, add a few more changes. · Try some of the following:  ? Make it a goal to eat a fruit or vegetable at every meal and at snacks. This will make it easy to get the recommended amount of fruits and vegetables each day. ? Try yogurt topped with fruit and nuts for a snack or healthy dessert. ? Add lettuce, tomato, cucumber, and onion to sandwiches. ? Combine a ready-made pizza crust with low-fat mozzarella cheese and lots of vegetable toppings. Try using tomatoes, squash, spinach, broccoli, carrots, cauliflower, and onions. ? Have a variety of cut-up vegetables with a low-fat dip as an appetizer instead of chips and dip. ? Sprinkle sunflower seeds or chopped almonds over salads. Or try adding chopped walnuts or almonds to cooked vegetables.   ? Try some vegetarian meals using beans and peas. Add garbanzo or kidney beans to salads. Make burritos and tacos with mashed diego beans or black beans. Where can you learn more? Go to https://chsaraeb.Embedded Internet Solutions. org and sign in to your [employer] account. Enter C713 in the Square box to learn more about \"DASH Diet: Care Instructions. \"     If you do not have an account, please click on the \"Sign Up Now\" link. Current as of: April 9, 2019  Content Version: 12.3  © 8301-4755 Healthwise, Incorporated. Care instructions adapted under license by [facility] ([facility]). If you have questions about a medical condition or this instruction, always ask your healthcare professional. [address] any warranty or liability for your use of this information.

## 2020-05-31 ENCOUNTER — APPOINTMENT (OUTPATIENT)
Dept: CT IMAGING | Age: 25
End: 2020-05-31
Payer: COMMERCIAL

## 2020-05-31 ENCOUNTER — HOSPITAL ENCOUNTER (EMERGENCY)
Age: 25
Discharge: HOME | End: 2020-05-31
Payer: COMMERCIAL

## 2020-05-31 VITALS
HEART RATE: 85 BPM | DIASTOLIC BLOOD PRESSURE: 70 MMHG | RESPIRATION RATE: 16 BRPM | WEIGHT: 156 LBS | TEMPERATURE: 98.3 F | HEIGHT: 65 IN | SYSTOLIC BLOOD PRESSURE: 110 MMHG | OXYGEN SATURATION: 99 % | BODY MASS INDEX: 25.99 KG/M2

## 2020-05-31 LAB
A/G RATIO: 1.3 (ref 1.1–2.2)
ALBUMIN SERPL-MCNC: 4.7 G/DL (ref 3.4–5)
ALP BLD-CCNC: 96 U/L (ref 40–129)
ALT SERPL-CCNC: 54 U/L (ref 10–40)
ANION GAP SERPL CALCULATED.3IONS-SCNC: 11 MMOL/L (ref 3–16)
AST SERPL-CCNC: 33 U/L (ref 15–37)
BASOPHILS ABSOLUTE: 0 K/UL (ref 0–0.2)
BASOPHILS RELATIVE PERCENT: 0.4 %
BILIRUB SERPL-MCNC: 0.7 MG/DL (ref 0–1)
BILIRUBIN URINE: NEGATIVE
BLOOD, URINE: NEGATIVE
BUN BLDV-MCNC: 7 MG/DL (ref 7–20)
CALCIUM SERPL-MCNC: 9.7 MG/DL (ref 8.3–10.6)
CHLORIDE BLD-SCNC: 101 MMOL/L (ref 99–110)
CLARITY: CLEAR
CO2: 28 MMOL/L (ref 21–32)
COLOR: YELLOW
CREAT SERPL-MCNC: 0.8 MG/DL (ref 0.9–1.3)
EOSINOPHILS ABSOLUTE: 0.1 K/UL (ref 0–0.6)
EOSINOPHILS RELATIVE PERCENT: 0.9 %
GFR AFRICAN AMERICAN: >60
GFR NON-AFRICAN AMERICAN: >60
GLOBULIN: 3.7 G/DL
GLUCOSE BLD-MCNC: 79 MG/DL (ref 70–99)
GLUCOSE URINE: NEGATIVE MG/DL
HCT VFR BLD CALC: 44.3 % (ref 40.5–52.5)
HEMOGLOBIN: 15.1 G/DL (ref 13.5–17.5)
KETONES, URINE: NEGATIVE MG/DL
LEUKOCYTE ESTERASE, URINE: NEGATIVE
LIPASE: 31 U/L (ref 13–60)
LYMPHOCYTES ABSOLUTE: 2.7 K/UL (ref 1–5.1)
LYMPHOCYTES RELATIVE PERCENT: 24 %
MCH RBC QN AUTO: 30.5 PG (ref 26–34)
MCHC RBC AUTO-ENTMCNC: 34.1 G/DL (ref 31–36)
MCV RBC AUTO: 89.5 FL (ref 80–100)
MICROSCOPIC EXAMINATION: NORMAL
MONOCYTES ABSOLUTE: 1.5 K/UL (ref 0–1.3)
MONOCYTES RELATIVE PERCENT: 12.9 %
NEUTROPHILS ABSOLUTE: 7 K/UL (ref 1.7–7.7)
NEUTROPHILS RELATIVE PERCENT: 61.8 %
NITRITE, URINE: NEGATIVE
PDW BLD-RTO: 13.5 % (ref 12.4–15.4)
PH UA: 8 (ref 5–8)
PLATELET # BLD: 222 K/UL (ref 135–450)
PMV BLD AUTO: 9.6 FL (ref 5–10.5)
POTASSIUM REFLEX MAGNESIUM: 3.6 MMOL/L (ref 3.5–5.1)
PROTEIN UA: NEGATIVE MG/DL
RBC # BLD: 4.95 M/UL (ref 4.2–5.9)
SODIUM BLD-SCNC: 140 MMOL/L (ref 136–145)
SPECIFIC GRAVITY UA: <1.005 (ref 1–1.03)
TOTAL PROTEIN: 8.4 G/DL (ref 6.4–8.2)
URINE REFLEX TO CULTURE: NORMAL
URINE TYPE: NORMAL
UROBILINOGEN, URINE: 0.2 E.U./DL
WBC # BLD: 11.3 K/UL (ref 4–11)

## 2020-05-31 PROCEDURE — 6360000004 HC RX CONTRAST MEDICATION: Performed by: PHYSICIAN ASSISTANT

## 2020-05-31 PROCEDURE — 6360000002 HC RX W HCPCS: Performed by: PHYSICIAN ASSISTANT

## 2020-05-31 PROCEDURE — 2580000003 HC RX 258: Performed by: PHYSICIAN ASSISTANT

## 2020-05-31 PROCEDURE — 96374 THER/PROPH/DIAG INJ IV PUSH: CPT

## 2020-05-31 PROCEDURE — 80053 COMPREHEN METABOLIC PANEL: CPT

## 2020-05-31 PROCEDURE — 74177 CT ABD & PELVIS W/CONTRAST: CPT

## 2020-05-31 PROCEDURE — 96375 TX/PRO/DX INJ NEW DRUG ADDON: CPT

## 2020-05-31 PROCEDURE — 99284 EMERGENCY DEPT VISIT MOD MDM: CPT

## 2020-05-31 PROCEDURE — 83690 ASSAY OF LIPASE: CPT

## 2020-05-31 PROCEDURE — 85025 COMPLETE CBC W/AUTO DIFF WBC: CPT

## 2020-05-31 PROCEDURE — 36415 COLL VENOUS BLD VENIPUNCTURE: CPT

## 2020-05-31 PROCEDURE — 2500000003 HC RX 250 WO HCPCS: Performed by: PHYSICIAN ASSISTANT

## 2020-05-31 PROCEDURE — 81003 URINALYSIS AUTO W/O SCOPE: CPT

## 2020-05-31 RX ORDER — 0.9 % SODIUM CHLORIDE 0.9 %
1000 INTRAVENOUS SOLUTION INTRAVENOUS ONCE
Status: COMPLETED | OUTPATIENT
Start: 2020-05-31 | End: 2020-05-31

## 2020-05-31 RX ORDER — ONDANSETRON 2 MG/ML
4 INJECTION INTRAMUSCULAR; INTRAVENOUS ONCE
Status: COMPLETED | OUTPATIENT
Start: 2020-05-31 | End: 2020-05-31

## 2020-05-31 RX ORDER — KETOROLAC TROMETHAMINE 30 MG/ML
30 INJECTION, SOLUTION INTRAMUSCULAR; INTRAVENOUS ONCE
Status: COMPLETED | OUTPATIENT
Start: 2020-05-31 | End: 2020-05-31

## 2020-05-31 RX ORDER — SUCRALFATE ORAL 1 G/10ML
1 SUSPENSION ORAL 4 TIMES DAILY
Qty: 1200 ML | Refills: 3 | Status: SHIPPED | OUTPATIENT
Start: 2020-05-31 | End: 2020-06-03

## 2020-05-31 RX ORDER — DICYCLOMINE HYDROCHLORIDE 10 MG/1
10 CAPSULE ORAL EVERY 6 HOURS PRN
Qty: 20 CAPSULE | Refills: 0 | Status: SHIPPED | OUTPATIENT
Start: 2020-05-31 | End: 2020-06-03

## 2020-05-31 RX ORDER — ONDANSETRON 4 MG/1
4 TABLET, ORALLY DISINTEGRATING ORAL EVERY 8 HOURS PRN
Qty: 20 TABLET | Refills: 0 | Status: SHIPPED | OUTPATIENT
Start: 2020-05-31 | End: 2020-06-03

## 2020-05-31 RX ADMIN — SODIUM CHLORIDE 1000 ML: 9 INJECTION, SOLUTION INTRAVENOUS at 13:17

## 2020-05-31 RX ADMIN — IOPAMIDOL 75 ML: 755 INJECTION, SOLUTION INTRAVENOUS at 14:01

## 2020-05-31 RX ADMIN — ONDANSETRON 4 MG: 2 INJECTION INTRAMUSCULAR; INTRAVENOUS at 13:16

## 2020-05-31 RX ADMIN — FAMOTIDINE 20 MG: 10 INJECTION INTRAVENOUS at 13:17

## 2020-05-31 RX ADMIN — KETOROLAC TROMETHAMINE 30 MG: 30 INJECTION, SOLUTION INTRAMUSCULAR at 13:16

## 2020-05-31 ASSESSMENT — ENCOUNTER SYMPTOMS
SHORTNESS OF BREATH: 0
NAUSEA: 1
ABDOMINAL PAIN: 1
RESPIRATORY NEGATIVE: 1
CONSTIPATION: 0
COLOR CHANGE: 0
VOMITING: 0
CHEST TIGHTNESS: 0
DIARRHEA: 0
BACK PAIN: 1
COUGH: 0

## 2020-05-31 ASSESSMENT — PAIN SCALES - GENERAL
PAINLEVEL_OUTOF10: 5
PAINLEVEL_OUTOF10: 5

## 2020-05-31 ASSESSMENT — PAIN DESCRIPTION - LOCATION: LOCATION: ABDOMEN

## 2020-05-31 ASSESSMENT — PAIN DESCRIPTION - PAIN TYPE: TYPE: ACUTE PAIN

## 2020-05-31 NOTE — ED NOTES
Bed: 26  Expected date:   Expected time:   Means of arrival: Walk In  Comments:     Ga Patiño RN  05/31/20 4529

## 2020-05-31 NOTE — ED PROVIDER NOTES
reviewed. No pertinent family history. SOCIAL HISTORY       Social History     Tobacco Use    Smoking status: Never Smoker    Smokeless tobacco: Never Used   Substance Use Topics    Alcohol use: Yes     Comment: 1 Beer per day    Drug use: No       SCREENINGS             PHYSICAL EXAM    (up to 7 for level 4, 8 or more for level 5)     ED Triage Vitals [05/31/20 1245]   BP Temp Temp Source Pulse Resp SpO2 Height Weight   (!) 156/88 98.3 °F (36.8 °C) Oral 93 20 99 % 5' 5\" (1.651 m) 156 lb (70.8 kg)       Physical Exam  Constitutional:       General: He is not in acute distress. Appearance: Normal appearance. He is well-developed. He is not ill-appearing, toxic-appearing or diaphoretic. HENT:      Head: Normocephalic and atraumatic. Right Ear: External ear normal.      Left Ear: External ear normal.   Eyes:      General:         Right eye: No discharge. Left eye: No discharge. Extraocular Movements: Extraocular movements intact. Conjunctiva/sclera: Conjunctivae normal.      Pupils: Pupils are equal, round, and reactive to light. Neck:      Musculoskeletal: Normal range of motion and neck supple. Cardiovascular:      Rate and Rhythm: Normal rate and regular rhythm. Pulses: Normal pulses. Heart sounds: Normal heart sounds. No murmur. No friction rub. No gallop. Comments: 2+ radial pulses bilaterally. No pedal edema. No calf tenderness. No JVD. Pulmonary:      Effort: Pulmonary effort is normal. No respiratory distress. Breath sounds: Normal breath sounds. No stridor. No wheezing, rhonchi or rales. Chest:      Chest wall: No tenderness. Abdominal:      General: Abdomen is flat. Bowel sounds are normal. There is no distension. Palpations: Abdomen is soft. There is no mass. Tenderness: There is abdominal tenderness in the periumbilical area, left upper quadrant and left lower quadrant.  There is no right CVA tenderness, left CVA tenderness, completed with a voice recognition program.  Efforts were made to edit the dictations but occasionally words are mis-transcribed.)    AIDEN Cid (electronically signed)          AIDEN Cruz  05/31/20 [phone] [id number]

## 2020-05-31 NOTE — ED NOTES
Pt c/o abd pain, bowel sounds present and active in all 4 quadrants, no tenderness reported. Pt does not have distention or rigid abdominal areas. Pt states no N/V, no blood, dysuria, or increased frequency of urination and bowel movement formed, brown, and no blood reported.         Narciso Driscoll RN  05/31/20 3003

## 2020-05-31 NOTE — ED NOTES
Pt discharged in stable condition, VSS, no signs of distress, discharge instructions and meds reviewed. Pt verbalizes understanding or concerns unaddressed.         Surya Sauceda RN  05/31/20 3731

## 2020-06-02 ENCOUNTER — TELEPHONE (OUTPATIENT)
Dept: FAMILY MEDICINE CLINIC | Age: 25
End: 2020-06-02

## 2020-06-03 ENCOUNTER — OFFICE VISIT (OUTPATIENT)
Dept: FAMILY MEDICINE CLINIC | Age: 25
End: 2020-06-03
Payer: COMMERCIAL

## 2020-06-03 VITALS
DIASTOLIC BLOOD PRESSURE: 80 MMHG | BODY MASS INDEX: 25.63 KG/M2 | TEMPERATURE: 97.9 F | HEART RATE: 72 BPM | SYSTOLIC BLOOD PRESSURE: 110 MMHG | WEIGHT: 154 LBS | OXYGEN SATURATION: 100 %

## 2020-06-03 PROCEDURE — 1111F DSCHRG MED/CURRENT MED MERGE: CPT | Performed by: FAMILY MEDICINE

## 2020-06-03 PROCEDURE — G8427 DOCREV CUR MEDS BY ELIG CLIN: HCPCS | Performed by: FAMILY MEDICINE

## 2020-06-03 PROCEDURE — 99214 OFFICE O/P EST MOD 30 MIN: CPT | Performed by: FAMILY MEDICINE

## 2020-06-03 PROCEDURE — 1036F TOBACCO NON-USER: CPT | Performed by: FAMILY MEDICINE

## 2020-06-03 PROCEDURE — G8419 CALC BMI OUT NRM PARAM NOF/U: HCPCS | Performed by: FAMILY MEDICINE

## 2020-06-03 RX ORDER — OMEPRAZOLE 20 MG/1
CAPSULE, DELAYED RELEASE ORAL
Qty: 180 CAPSULE | Refills: 1 | Status: SHIPPED | OUTPATIENT
Start: 2020-06-03 | End: 2020-08-04 | Stop reason: DRUGHIGH

## 2020-06-03 NOTE — PROGRESS NOTES
[address] Note    Date: 6/3/2020                                               Subjective/Objective:     Chief Complaint   Patient presents with    Follow-Up from Hospital     Abdominal pain. .. HPI   Pt here for hospital follow up. Was seen at [facility] on 5/31/2020 for acute left sided abdominal pain starting 4 days prior. Went from left upper down to left lower quadrant and across the stomach. Also had occasional back pain and occasional headache. Had some nausea that worsened with eating. No vomiting, no shortness of breath, no urinary symptoms. Symptoms resolved completely in the ED with administration of Toradol and Zofran and Pepcid and fluids. CT of the abdomen pelvis was unremarkable. Prior to the visit, patient was already on omeprazole (though he wasn't on it for a week), so Carafate was added to his regimen. Was also discharged home on Zofran and Bentyl. Since leaving the ED, pt feels well. Is taking all prescribed medicines. Denies any nausea. No pain with eating. No vomiting. Pt reports that he has no hx of significant abdominal pain prior to this. Pt has hx of HTN, though this is unclear. I put him on Lisinopril a few months ago but he hasn't taken it in 2 weeks. 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 Erna Schmitz at [facility]       Current Outpatient Medications   Medication Sig Dispense Refill    omeprazole (PRILOSEC) 20 MG delayed release capsule Take 1 capsule by mouth 2 times daily; take on an empty stomach and eat a meal or snack 45 to 60 minutes hour after each dose. 180 capsule 1     No current facility-administered medications for this visit.         No Known Allergies    Review of Systems   No fever, no CP, no 45 to 60 minutes hour after each dose. Dispense: 180 capsule; Refill: 1         Orders Placed This Encounter   Procedures    HI DISCHARGE MEDS RECONCILED W/ CURRENT OUTPATIENT MED LIST       Return in about 3 months (around 9/3/2020) for blood pressure check.     Glenroy Webb MD    6/3/2020  2:03 PM

## 2020-08-03 ENCOUNTER — OFFICE VISIT (OUTPATIENT)
Dept: FAMILY MEDICINE CLINIC | Age: 25
End: 2020-08-03
Payer: COMMERCIAL

## 2020-08-03 ENCOUNTER — HOSPITAL ENCOUNTER (OUTPATIENT)
Age: 25
Discharge: HOME | End: 2020-08-03
Payer: COMMERCIAL

## 2020-08-03 ENCOUNTER — HOSPITAL ENCOUNTER (OUTPATIENT)
Dept: GENERAL RADIOLOGY | Age: 25
Discharge: HOME | End: 2020-08-03
Payer: COMMERCIAL

## 2020-08-03 VITALS
DIASTOLIC BLOOD PRESSURE: 80 MMHG | HEART RATE: 74 BPM | TEMPERATURE: 97.2 F | OXYGEN SATURATION: 99 % | BODY MASS INDEX: 25.46 KG/M2 | SYSTOLIC BLOOD PRESSURE: 138 MMHG | WEIGHT: 153 LBS

## 2020-08-03 PROCEDURE — 73630 X-RAY EXAM OF FOOT: CPT

## 2020-08-03 PROCEDURE — 1036F TOBACCO NON-USER: CPT | Performed by: FAMILY MEDICINE

## 2020-08-03 PROCEDURE — G8419 CALC BMI OUT NRM PARAM NOF/U: HCPCS | Performed by: FAMILY MEDICINE

## 2020-08-03 PROCEDURE — G8427 DOCREV CUR MEDS BY ELIG CLIN: HCPCS | Performed by: FAMILY MEDICINE

## 2020-08-03 PROCEDURE — 99213 OFFICE O/P EST LOW 20 MIN: CPT | Performed by: FAMILY MEDICINE

## 2020-08-03 RX ORDER — NAPROXEN 500 MG/1
500 TABLET ORAL 2 TIMES DAILY WITH MEALS
Qty: 42 TABLET | Refills: 0 | Status: SHIPPED | OUTPATIENT
Start: 2020-08-03 | End: 2020-08-18

## 2020-08-03 NOTE — PATIENT INSTRUCTIONS
Patient Education        Foot Pain: Care Instructions  Your Care Instructions  Foot injuries that cause pain and swelling are fairly common. Almost all sports or home repair projects can cause a misstep that ends up as foot pain. Normal wear and tear, especially as you get older, also can cause foot pain. Most minor foot injuries will heal on their own, and home treatment is usually all you need to do. If you have a severe injury, you may need tests and treatment. Follow-up care is a key part of your treatment and safety. Be sure to make and go to all appointments, and call your doctor if you are having problems. It's also a good idea to know your test results and keep a list of the medicines you take. How can you care for yourself at home? · Take pain medicines exactly as directed. ? If the doctor gave you a prescription medicine for pain, take it as prescribed. ? If you are not taking a prescription pain medicine, ask your doctor if you can take an over-the-counter medicine. · Rest and protect your foot. Take a break from any activity that may cause pain. · Put ice or a cold pack on your foot for 10 to 20 minutes at a time. Put a thin cloth between the ice and your skin. · Prop up the sore foot on a pillow when you ice it or anytime you sit or lie down during the next 3 days. Try to keep it above the level of your heart. This will help reduce swelling. · Your doctor may recommend that you wrap your foot with an elastic bandage. Keep your foot wrapped for as long as your doctor advises. · If your doctor recommends crutches, use them as directed. · Wear roomy footwear. · As soon as pain and swelling end, begin gentle exercises of your foot. Your doctor can tell you which exercises will help. When should you call for help? [id number] anytime you think you may need emergency care. For example, call if:  · Your foot turns pale, white, blue, or cold.   Call your doctor now or seek immediate medical care if:  · You cannot move or stand on your foot. · Your foot looks twisted or out of its normal position. · Your foot is not stable when you step down. · You have signs of infection, such as:  ? Increased pain, swelling, warmth, or redness. ? Red streaks leading from the sore area. ? Pus draining from a place on your foot. ? A fever. · Your foot is numb or tingly. Watch closely for changes in your health, and be sure to contact your doctor if:  · You do not get better as expected. · You have bruises from an injury that last longer than 2 weeks. Where can you learn more? Go to https://ZazzypeSensorist.BigDoor. org and sign in to your [employer] account. Enter V917 in the SoundCure box to learn more about \"Foot Pain: Care Instructions. \"     If you do not have an account, please click on the \"Sign Up Now\" link. Current as of: March 2, 2020               Content Version: 12.5  © 2006-2020 Healthwise, Incorporated. Care instructions adapted under license by [facility] ([facility]). If you have questions about a medical condition or this instruction, always ask your healthcare professional. [place] [address] any warranty or liability for your use of this information.

## 2020-08-03 NOTE — PROGRESS NOTES
[address] Note    Date: 8/3/2020                                               Subjective/Objective:     Chief Complaint   Patient presents with    Leg Pain       HPI   R great toe pain starting 2 weeks ago. Also hurts at base of R great toe. No redness. No injury. Pt denies problem with foot previously. No numbness or tingling. Okay with walking, just hurts when he pushes on it or flexes toe. 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 Abril Cantu at [facility]       Current Outpatient Medications   Medication Sig Dispense Refill    naproxen (NAPROSYN) 500 MG tablet Take 1 tablet by mouth 2 times daily (with meals) for 21 days 42 tablet 0    omeprazole (PRILOSEC) 20 MG delayed release capsule Take 1 capsule by mouth 2 times daily; take on an empty stomach and eat a meal or snack 45 to 60 minutes hour after each dose. 180 capsule 1     No current facility-administered medications for this visit. No Known Allergies    Review of Systems   No fever, no vomiting    Vitals:  /80   Pulse 74   Temp 97.2 °F (36.2 °C)   Wt 153 lb (69.4 kg)   SpO2 99%   BMI 25.46 kg/m²     Physical Exam   General:  Well-appearing, NAD, alert, non-toxic  HEENT:  Normocephalic, atraumatic. CHEST/LUNGS: No dyspnea  EXTREMETIES: Normal movement of all extremities. No edema. No joint swelling. No erythema of R foot. +mild TTP of base of R great toe. Full ROM of R great toe  SKIN:  No rash, no cellulitis, no bruising, no petechiae/purpura/vesicles/pustules/abscess  PSYCH:  A+O x 3; normal affect  NEURO:  GCS 15, CN2-12 grossly intact, no focal motor/sensory deficits, normal gait, normal speech      Assessment/Plan     28-year-old male with right great toe/foot pain.   Gout seems unlikely given patient's age and lack of redness or swelling. Most likely due to sprain/strain. Recommend rest, ice, Naprosyn. Will check x-ray to rule out abnormality. Discussed with patient medication/s dosage, instructions, potential S/E, A/R and Drug Interaction  Educational material provided regarding patient's condition  Advise to return here if worse or go to nearest ER  Encourage fluids  Pt discharged in stable condition at 10:19      1. Foot pain, right    - XR FOOT RIGHT (MIN 3 VIEWS); Future  - naproxen (NAPROSYN) 500 MG tablet; Take 1 tablet by mouth 2 times daily (with meals) for 21 days  Dispense: 42 tablet; Refill: 0    2. Toe pain, right    - naproxen (NAPROSYN) 500 MG tablet; Take 1 tablet by mouth 2 times daily (with meals) for 21 days  Dispense: 42 tablet; Refill: 0         Orders Placed This Encounter   Procedures    XR FOOT RIGHT (MIN 3 VIEWS)     Standing Status:   Future     Standing Expiration Date:   8/3/2021       Return if symptoms worsen or fail to improve.     Quoc Guzmán MD    8/3/2020  10:18 AM

## 2020-08-04 ENCOUNTER — OFFICE VISIT (OUTPATIENT)
Dept: ENT CLINIC | Age: 25
End: 2020-08-04
Payer: COMMERCIAL

## 2020-08-04 VITALS — DIASTOLIC BLOOD PRESSURE: 87 MMHG | HEART RATE: 76 BPM | TEMPERATURE: 97.7 F | SYSTOLIC BLOOD PRESSURE: 137 MMHG

## 2020-08-04 PROCEDURE — 31575 DIAGNOSTIC LARYNGOSCOPY: CPT | Performed by: OTOLARYNGOLOGY

## 2020-08-04 RX ORDER — OMEPRAZOLE 40 MG/1
CAPSULE, DELAYED RELEASE ORAL
Qty: 60 CAPSULE | Refills: 2 | Status: SHIPPED | OUTPATIENT
Start: 2020-08-04 | End: 2020-09-09 | Stop reason: SDUPTHER

## 2020-08-04 NOTE — PATIENT INSTRUCTIONS
Check your lymph node under your chin once or twice a month and report any enlargement to 2 to 3 times the current size, change to rock hard consistency, or fixation (signs of possible malignancy). Call if these changes occur.

## 2020-08-04 NOTE — PROGRESS NOTES
Chief Complaint   Patient presents with    Laryngitis     repeat flexible fiberoptic throat endoscopy, bump underneath chin       PROCEDURE:  FLEXIBLE FIBEROPTIC NASOPHARYNGOLARYNGOSCOPY  INDICATION:  Inadequate visualization by indirect laryngoscopy mirror examination and need for detailed endoscopic examination of the larynx and pharynx to evaluate and recheck LPRD and chronic reflux laryngitis. Still clearing throat frequently. Feel lump under chin. Symptoms are better but not gone. INFORMED CONSENT:  The procedure was described to the patient, including method of anesthesia. The patient was advised of the medical necessity for this procedure. The risks and potential complications were discussed, including, but not limited to, bleeding, infection, adverse reaction to medications, hoarseness, sore throat, inability to obtain adequate visualization, and future need for rigid operative endoscopy. The expected outcome, potential benefits and the alternatives of therapy were discussed. Carlos Eduardo Blanc asked appropriate questions and then expressed the lack of any further questions, understanding, acceptance, and the desire to undergo with this procedure, granting verbal informed consent. FINDINGS:  There was mild edema and erythema of the arytenoid and interarytenoid mucosa consistent with posterior laryngitis secondary to laryngopharyngeal reflux. The vocal cords appeared to be normal, with no nodule, ulceration, polyp, leukoplakia or other lesions. The vocal cords appeared to be normally mobile bilaterally with midline approximation on phonation. Sensation of the hypopharynx and larynx appeared to be normal when touched by the end of the flexible scope. The nasopharynx, eustachian tube orifices and fossa of Rosenmüller, oropharynx, base of tongue, hypopharynx, supraglottis, subglottis, and piriform sinuses all appeared to be normal, with no lesions. Visualization was excellent throughout the examination. Examination:    Neck: There was a nontender, vague 5 millimeter nodule or lymph node in submental area. There were no other masses and no cervical lymphadenopathy appreciated. DESCRIPTION OF PROCEDURE:  The right and left nasal cavity was topically anesthetized and decongested with a 50-50 mixture of 0.05% oxymetazoline solution and topical 4% lidocaine solution by nasal sprayer, twice. After about ten minutes, the flexible fiberoptic nasopharyngolaryngoscope, with camera attached, was inserted through the right nare/nasal cavity and advanced to the nasopharynx and then to the hypopharynx and larynx. The [employer] video system was used. After adequate endoscopic visualization, the endoscope was removed. The patient appeared to tolerate the procedure well with no evidence of perioperative complications. Jayden Brantley / Kayla Hernandez / Lacy Desai / Celina Zayas was seen today for laryngitis. Diagnoses and all orders for this visit:    LPRD (laryngopharyngeal reflux disease)  -     omeprazole (PRILOSEC) 40 MG delayed release capsule; Take 1 tab twice daily, on empty stomach (when not eaten or drunk anything for 2 hours) and eat a meal or snack 30 - 60 minutes after each dose. Chronic laryngitis  -     omeprazole (PRILOSEC) 40 MG delayed release capsule; Take 1 tab twice daily, on empty stomach (when not eaten or drunk anything for 2 hours) and eat a meal or snack 30 - 60 minutes after each dose. RECOMMENDATIONS / PLAN    1. I increased omeprazole dose to 40 mg BID   2. Return in about 6 months (around 2/4/2021) for repeat flexible fiberoptic throat endoscopy, recheck/follow-up, and sooner if condition worsens.

## 2020-08-18 RX ORDER — NAPROXEN 500 MG/1
500 TABLET ORAL 2 TIMES DAILY WITH MEALS
Qty: 42 TABLET | Refills: 0 | Status: SHIPPED | OUTPATIENT
Start: 2020-08-18 | End: 2020-09-09

## 2020-09-09 ENCOUNTER — OFFICE VISIT (OUTPATIENT)
Dept: FAMILY MEDICINE CLINIC | Age: 25
End: 2020-09-09
Payer: COMMERCIAL

## 2020-09-09 VITALS
BODY MASS INDEX: 25.29 KG/M2 | SYSTOLIC BLOOD PRESSURE: 125 MMHG | OXYGEN SATURATION: 98 % | WEIGHT: 152 LBS | TEMPERATURE: 98 F | HEART RATE: 68 BPM | DIASTOLIC BLOOD PRESSURE: 85 MMHG

## 2020-09-09 DIAGNOSIS — Z00.00 ANNUAL PHYSICAL EXAM: ICD-10-CM

## 2020-09-09 PROCEDURE — 99213 OFFICE O/P EST LOW 20 MIN: CPT | Performed by: FAMILY MEDICINE

## 2020-09-09 PROCEDURE — G8419 CALC BMI OUT NRM PARAM NOF/U: HCPCS | Performed by: FAMILY MEDICINE

## 2020-09-09 PROCEDURE — 1036F TOBACCO NON-USER: CPT | Performed by: FAMILY MEDICINE

## 2020-09-09 PROCEDURE — G8427 DOCREV CUR MEDS BY ELIG CLIN: HCPCS | Performed by: FAMILY MEDICINE

## 2020-09-09 RX ORDER — OMEPRAZOLE 40 MG/1
CAPSULE, DELAYED RELEASE ORAL
Qty: 60 CAPSULE | Refills: 2 | Status: SHIPPED | OUTPATIENT
Start: 2020-09-09 | End: 2021-01-04 | Stop reason: SDUPTHER

## 2020-09-09 NOTE — PROGRESS NOTES
[address] Note    Date: 9/9/2020                                               Subjective/Objective:     Chief Complaint   Patient presents with    Blood Pressure Check     wants lipid check       HPI   Patient is here for blood pressure check. Was on lisinopril several months ago, but stopped. Denies CP or SOB. 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 Chase Ordoñez at [facility]       Current Outpatient Medications   Medication Sig Dispense Refill    omeprazole (PRILOSEC) 40 MG delayed release capsule Take 1 tab twice daily, on empty stomach (when not eaten or drunk anything for 2 hours) and eat a meal or snack 30 - 60 minutes after each dose. 60 capsule 2     No current facility-administered medications for this visit. No Known Allergies    Review of Systems   No dizziness, no vomiting    Vitals:  /85   Pulse 68   Temp 98 °F (36.7 °C)   Wt 152 lb (68.9 kg)   SpO2 98%   BMI 25.29 kg/m²     Physical Exam   General:  Well-appearing, NAD, alert, non-toxic  HEENT:  Normocephalic, atraumatic. Pupils equal and round. CHEST/LUNGS: CTAB, no crackles, no wheeze, no rhonchi. Symmetric rise  CARDIOVASCULAR: RRR,  no murmur, no rub  EXTREMETIES: Normal movement of all extremities  SKIN:  No rash, no cellulitis, no bruising, no petechiae/purpura/vesicles/pustules/abscess  PSYCH:  A+O x 3; normal affect  NEURO:  GCS 15, CN2-12 grossly intact, no focal motor/sensory deficits, no cerebellar deficits, normal gait, normal speech      Assessment/Plan     18-year-old male with history of elevated blood pressure. Patient is currently normotensive off medicines. We will continue to hold medication. Follow-up in 6 months for blood pressure check.     Discharged in stable condition at 1:35 PM.    1. LPRD (laryngopharyngeal reflux disease)    - omeprazole (PRILOSEC) 40 MG delayed release capsule; Take 1 tab twice daily, on empty stomach (when not eaten or drunk anything for 2 hours) and eat a meal or snack 30 - 60 minutes after each dose. Dispense: 60 capsule; Refill: 2    2. Chronic laryngitis    - omeprazole (PRILOSEC) 40 MG delayed release capsule; Take 1 tab twice daily, on empty stomach (when not eaten or drunk anything for 2 hours) and eat a meal or snack 30 - 60 minutes after each dose. Dispense: 60 capsule; Refill: 2    3. Annual physical exam    - Lipid, Fasting; Future  - Glucose, Fasting; Future    4. Elevated blood pressure reading without diagnosis of hypertension           Orders Placed This Encounter   Procedures    Lipid, Fasting     Standing Status:   Future     Number of Occurrences:   1     Standing Expiration Date:   9/9/2021    Glucose, Fasting     Standing Status:   Future     Number of Occurrences:   1     Standing Expiration Date:   9/9/2021       No follow-ups on file.     Chaim Regalado MD    9/9/2020  1:55 PM

## 2020-09-10 LAB
CHOLESTEROL, FASTING: 201 MG/DL (ref 0–199)
GLUCOSE FASTING: 89 MG/DL (ref 70–99)
HDLC SERPL-MCNC: 43 MG/DL (ref 40–60)
LDL CHOLESTEROL CALCULATED: 105 MG/DL
TRIGLYCERIDE, FASTING: 264 MG/DL (ref 0–150)
VLDLC SERPL CALC-MCNC: 53 MG/DL

## 2021-01-04 ENCOUNTER — OFFICE VISIT (OUTPATIENT)
Dept: ENT CLINIC | Age: 26
End: 2021-01-04
Payer: COMMERCIAL

## 2021-01-04 VITALS
HEART RATE: 113 BPM | HEIGHT: 65 IN | WEIGHT: 150 LBS | SYSTOLIC BLOOD PRESSURE: 137 MMHG | BODY MASS INDEX: 24.99 KG/M2 | TEMPERATURE: 97 F | DIASTOLIC BLOOD PRESSURE: 90 MMHG | OXYGEN SATURATION: 98 %

## 2021-01-04 DIAGNOSIS — J37.0 CHRONIC LARYNGITIS: ICD-10-CM

## 2021-01-04 DIAGNOSIS — K21.9 LPRD (LARYNGOPHARYNGEAL REFLUX DISEASE): ICD-10-CM

## 2021-01-04 PROCEDURE — 31575 DIAGNOSTIC LARYNGOSCOPY: CPT | Performed by: OTOLARYNGOLOGY

## 2021-01-04 RX ORDER — OMEPRAZOLE 40 MG/1
CAPSULE, DELAYED RELEASE ORAL
Qty: 180 CAPSULE | Refills: 1 | Status: SHIPPED | OUTPATIENT
Start: 2021-01-04 | End: 2021-05-14

## 2021-01-04 NOTE — PATIENT INSTRUCTIONS
Laryngopharyngeal Reflux    Laryngopharyngeal reflux (LPR) is a condition in which stomach fluid refluxes, or flows backwards, up into your throat. This affects the back area of your voice box. It happens hundreds of times a day and involves a very small amount of fluid each time. There is no sensation or awareness of this reflux, such as the heartburn, pain, or indigestion associated with a related condition, gastroesophageal reflux, or GERD. These two conditions are distinctly different, although they may coexist in a given person. Generally, you will not be aware when LPR is happening. However, the stomach fluid contains an enzyme, called pepsin, which causes inflammation in your throat, which in turn, causes your symptoms. The most common symptoms associated with LPR reflux are a sensation of a lump in the throat or mucus that you cannot clear or something stuck in the throat, chronic cough, chronic hoarseness, sorethroat, and postnasal drainage. The most common sign of LPR is frequent throat clearing. The only effective treatment for this condition is use of a medication, called a proton pump inhibitor (PPI). This medication decreases the amount of acid your stomach lining makes and puts into the stomach fluid. This raises the pH of the stomach fluid. Under this condition the pepsin is less active when reflux does occur and it, therefore, does not irritate your throat as much. Research with omeprazole, one type of PPI medication, showed that twice daily therapy may be needed to provide maximum and optimum effect to control or improve these symptoms. In the treatment of this condition, 40 mg once daily DOES NOT EQUAL 20 mg twice daily! In addition, the PPI medication must be taken on an empty stomach to maximize digestion and absorption of the medication. Your stomach is empty when you have had nothing to eat or drink for the preceding two hours.   Further, for maximum effectiveness, you information given by your pharmacist regarding your medications. Please ask if you have any additional questions. Patient Education        omeprazole  Pronunciation:  oh MEP ra zol  Brand:  FIRST Omeprazole, Omeprazole + SyrSpend SF Jessica, PriLOSEC, PriLOSEC OTC  What is the most important information I should know about omeprazole? Omeprazole can cause kidney problems. Tell your doctor if you are urinating less than usual, or if you have blood in your urine. Diarrhea may be a sign of a new infection. Call your doctor if you have diarrhea that is watery or has blood in it. Omeprazole may cause new or worsening symptoms of lupus. Tell your doctor if you have joint pain and a skin rash on your cheeks or arms that worsens in sunlight. You may be more likely to have a broken bone while taking this medicine long term or more than once per day. What is omeprazole? Omeprazole is a proton pump inhibitor that decreases the amount of acid produced in the stomach. Omeprazole is used to treat symptoms of gastroesophageal reflux disease (GERD) and other conditions caused by excess stomach acid. Omeprazole is also used to promote healing of erosive esophagitis (damage to your esophagus caused by stomach acid). Omeprazole may also be given together with antibiotics to treat gastric ulcer caused by infection with Helicobacter pylori (H. pylori). Over-the-counter (OTC) omeprazole is used in adults to help control heartburn that occurs 2 or more days per week. This medicine not for immediate relief of heartburn symptoms. OTC omeprazole must be taken on a regular basis for 14 days in a row. Omeprazole may also be used for purposes not listed in this medication guide. What should I discuss with my healthcare provider before taking omeprazole? Heartburn can mimic early symptoms of a heart attack.  Get emergency medical help if you have chest pain that spreads to your jaw or shoulder and you feel sweaty or light-headed. You should not use omeprazole if you are allergic to it, or if:  · you are also allergic to medicines like omeprazole, such as esomeprazole, lansoprazole, pantoprazole, rabeprazole, Nexium, Prevacid, Protonix, and others; or  · you also take HIV medication that contains rilpivirine (such as Luis , Chermalcomnn Moots). Ask a doctor or pharmacist if this medicine is safe to use if you have:  · trouble or pain with swallowing;  · bloody or black stools, vomit that looks like blood or coffee grounds;  · heartburn that has lasted for over 3 months;  · frequent chest pain, heartburn with wheezing;  · unexplained weight loss;  · nausea or vomiting, stomach pain;  · liver disease;  · low levels of magnesium in your blood; or  · osteoporosis or low bone mineral density (osteopenia). You may be more likely to have a broken bone in your hip, wrist, or spine while taking a proton pump inhibitor long-term or more than once per day. Talk with your doctor about ways to keep your bones healthy. Ask a doctor before using this medicine if you are pregnant or breast-feeding. Do not give this medicine to a child without medical advice. How should I take omeprazole? Follow all directions on your prescription label and read all medication guides or instruction sheets. Use the medicine exactly as directed. Use Prilosec OTC (over-the-counter) exactly as directed on the label, or as prescribed by your doctor. Read and carefully follow any Instructions for Use provided with your medicine. Ask your doctor or pharmacist if you do not understand these instructions. Shake the oral suspension (liquid) before you measure a dose. Use the dosing syringe provided, or use a medicine dose-measuring device (not a kitchen spoon). If you cannot swallow a capsule whole, open it and sprinkle the medicine into a spoonful of applesauce. Swallow the mixture right away without chewing. Do not save it for later use.   You must dissolve omeprazole powder in a small amount of water. This mixture can either be swallowed or given through a nasogastric (NG) feeding tube using a catheter-tipped syringe. Use this medicine for the full prescribed length of time, even if your symptoms quickly improve. OTC omeprazole should be taken for only 14 days in a row. It may take 1 to 4 days before your symptoms improve. Allow at least 4 months to pass before you start a new 14-day course of treatment. Call your doctor if your symptoms do not improve, or if they get worse. Some conditions are treated with a combination of omeprazole and antibiotics. Use all medications as directed. This medicine can affect the results of certain medical tests. Tell any doctor who treats you that you are using omeprazole. Store at room temperature away from moisture and heat. What happens if I miss a dose? Take the medicine as soon as you can, but skip the missed dose if it is almost time for your next dose. Do not take two doses at one time. What happens if I overdose? Seek emergency medical attention or call the Poison Help line at 1-168.888.9887. What should I avoid while taking omeprazole? This medicine can cause diarrhea, which may be a sign of a new infection. If you have diarrhea that is watery or bloody, call your doctor before using anti-diarrhea medicine. What are the possible side effects of omeprazole? Get emergency medical help if you have signs of an allergic reaction: hives; difficulty breathing; swelling of your face, lips, tongue, or throat.   Stop using omeprazole and call your doctor at once if you have:  · severe stomach pain, diarrhea that is watery or bloody;  · new or unusual pain in your wrist, thigh, hip, or back;  · seizure (convulsions);  · kidney problems --little or no urination, blood in your urine, swelling, rapid weight gain;  · low magnesium --dizziness, irregular heartbeats, feeling jittery, muscle cramps, muscle spasms, cough or choking feeling; or  · new or worsening symptoms of lupus --joint pain, and a skin rash on your cheeks or arms that worsens in sunlight. Taking omeprazole long-term may cause you to develop stomach growths called fundic gland polyps. Talk with your doctor about this risk. If you use omeprazole for longer than 3 years, you could develop a vitamin B-12 deficiency. Talk to your doctor about how to manage this condition if you develop it. Common side effects may include:  · stomach pain, gas;  · nausea, vomiting, diarrhea; or  · headache. This is not a complete list of side effects and others may occur. Call your doctor for medical advice about side effects. You may report side effects to FDA at 5-045-FDA-2209. What other drugs will affect omeprazole? Sometimes it is not safe to use certain medications at the same time. Some drugs can affect your blood levels of other drugs you take, which may increase side effects or make the medications less effective. Tell your doctor about all your current medicines. Many drugs can affect omeprazole, especially:  · clopidogrel;  · methotrexate;  · Patricia's wort; or  · an antibiotic --amoxicillin, clarithromycin, rifampin. This list is not complete and many other drugs may affect omeprazole. This includes prescription and over-the-counter medicines, vitamins, and herbal products. Not all possible drug interactions are listed here. Where can I get more information? Your pharmacist can provide more information about omeprazole. Remember, keep this and all other medicines out of the reach of children, never share your medicines with others, and use this medication only for the indication prescribed. Every effort has been made to ensure that the information provided by [address] is accurate, up-to-date, and complete, but no guarantee is made to that effect. Drug information contained herein may be time sensitive.  [employer] information has been compiled for use by healthcare practitioners and consumers in the [place] and therefore [employer] does not warrant that uses outside of the [place] are appropriate, unless specifically indicated otherwise. [facility] drug information does not endorse drugs, diagnose patients or recommend therapy. [facility] drug information is an informational resource designed to assist licensed healthcare practitioners in caring for their patients and/or to serve consumers viewing this service as a supplement to, and not a substitute for, the expertise, skill, knowledge and judgment of healthcare practitioners. The absence of a warning for a given drug or drug combination in no way should be construed to indicate that the drug or drug combination is safe, effective or appropriate for any given patient. [facility] does not assume any responsibility for any aspect of healthcare administered with the aid of information [facility] provides. The information contained herein is not intended to cover all possible uses, directions, precautions, warnings, drug interactions, allergic reactions, or adverse effects. If you have questions about the drugs you are taking, check with your doctor, nurse or pharmacist.  Copyright 2122-1902 [address]: 20.01. Revision date: 4/11/2019. Care instructions adapted under license by [facility] ([facility]). If you have questions about a medical condition or this instruction, always ask your healthcare professional. [place] [address] any warranty or liability for your use of this information.

## 2021-01-04 NOTE — PROGRESS NOTES
Chief Complaint   Patient presents with    Other     LPRD       PROCEDURE:  FLEXIBLE FIBEROPTIC NASOPHARYNGOLARYNGOSCOPY  INDICATION:  Inadequate visualization by indirect laryngoscopy mirror examination and need for detailed endoscopic examination of the larynx and pharynx to evaluate the upper aerodigestive tract for recheck LPRD and chronic reflux laryngitis. Patient stated that his throat symptoms are improved, better than before. \"A little bit is left\", \"about 70% is gone. \"      INFORMED CONSENT:  The procedure was described to the patient, including method of anesthesia. The patient was advised of the medical necessity for this procedure. The risks and potential complications were discussed, including, but not limited to, bleeding, infection, adverse reaction to medications, hoarseness, sore throat, inability to obtain adequate visualization, and future need for rigid operative endoscopy. The expected outcome, potential benefits and the alternatives of therapy were discussed. Oswaldo Haines asked appropriate questions and then expressed the lack of any further questions, understanding, acceptance, and the desire to undergo with this procedure, granting verbal informed consent. FINDINGS:  There was minimal to mild edema and erythema of the arytenoid and interarytenoid mucosa consistent with posterior laryngitis secondary to laryngopharyngeal reflux. The vocal cords appeared to be normal, with no nodule, ulceration, polyp, leukoplakia or other lesions, and appeared to be normally mobile bilaterally with midline approximation on phonation. Sensation of the hypopharynx and larynx appeared to be normal when touched by the end of the flexible scope. The nasopharynx, eustachian tube orifices and fossa of Rosenmüller, oropharynx, base of tongue, hypopharynx, supraglottis, subglottis, and piriform sinuses all appeared to be normal, with no lesions. Visualization was excellent throughout the examination. DESCRIPTION OF PROCEDURE:  The right and left nasal cavity was topically anesthetized and decongested with a 50-50 mixture of 0.05% oxymetazoline solution and topical 4% lidocaine solution by nasal sprayer, twice. After about ten minutes, the flexible fiberoptic nasopharyngolaryngoscope, with camera attached, was inserted through the right nare/nasal cavity and advanced to the nasopharynx and then to the hypopharynx and larynx. The [employer] video system was used. After adequate endoscopic visualization, the endoscope was removed. The patient appeared to tolerate the procedure well with no evidence of perioperative complications. Geri Rain / Nhan Boykin / Tiffanie Shen was seen today for other. Diagnoses and all orders for this visit:    LPRD (laryngopharyngeal reflux disease)  -     omeprazole (PRILOSEC) 40 MG delayed release capsule; Take 1 tab twice daily, on empty stomach (when not eaten or drunk anything for 2 hours) and eat a meal or snack 30 - 60 minutes after each dose. Chronic laryngitis  -     omeprazole (PRILOSEC) 40 MG delayed release capsule; Take 1 tab twice daily, on empty stomach (when not eaten or drunk anything for 2 hours) and eat a meal or snack 30 - 60 minutes after each dose. RECOMMENDATIONS / PLAN    1. Continue omeprazole, LPR D regimen. 2. Return in about 6 months (around 7/4/2021) for repeat flexible fiberoptic throat endoscopy, recheck/follow-up, and sooner if condition worsens.

## 2021-05-14 ENCOUNTER — OFFICE VISIT (OUTPATIENT)
Dept: FAMILY MEDICINE CLINIC | Age: 26
End: 2021-05-14
Payer: COMMERCIAL

## 2021-05-14 VITALS
WEIGHT: 151 LBS | SYSTOLIC BLOOD PRESSURE: 120 MMHG | OXYGEN SATURATION: 100 % | DIASTOLIC BLOOD PRESSURE: 80 MMHG | BODY MASS INDEX: 25.13 KG/M2 | HEART RATE: 94 BPM

## 2021-05-14 DIAGNOSIS — R10.9 ABDOMINAL PAIN, UNSPECIFIED ABDOMINAL LOCATION: ICD-10-CM

## 2021-05-14 DIAGNOSIS — Z00.00 ANNUAL PHYSICAL EXAM: Primary | ICD-10-CM

## 2021-05-14 PROCEDURE — 99395 PREV VISIT EST AGE 18-39: CPT | Performed by: FAMILY MEDICINE

## 2021-05-14 RX ORDER — IBUPROFEN 600 MG/1
600 TABLET ORAL 4 TIMES DAILY PRN
Qty: 120 TABLET | Refills: 0 | Status: SHIPPED | OUTPATIENT
Start: 2021-05-14 | End: 2021-06-11

## 2021-05-14 SDOH — ECONOMIC STABILITY: TRANSPORTATION INSECURITY
IN THE PAST 12 MONTHS, HAS THE LACK OF TRANSPORTATION KEPT YOU FROM MEDICAL APPOINTMENTS OR FROM GETTING MEDICATIONS?: NO

## 2021-05-14 SDOH — ECONOMIC STABILITY: INCOME INSECURITY: HOW HARD IS IT FOR YOU TO PAY FOR THE VERY BASICS LIKE FOOD, HOUSING, MEDICAL CARE, AND HEATING?: HARD

## 2021-05-14 ASSESSMENT — PATIENT HEALTH QUESTIONNAIRE - PHQ9
2. FEELING DOWN, DEPRESSED OR HOPELESS: 0
SUM OF ALL RESPONSES TO PHQ QUESTIONS 1-9: 0
1. LITTLE INTEREST OR PLEASURE IN DOING THINGS: 0

## 2021-05-14 NOTE — PROGRESS NOTES
[address] Note    Date: 5/14/2021                                               Subjective/Objective:     Chief Complaint   Patient presents with    Blood Pressure Check    GI Problem     side pain on and off        HPI   Patient is here for annual exam.  Last Tdap 2018. Patient has history of hypertension, was on lisinopril in the past, stopped last year. Denies chest pain or shortness of breath. Pain in L lateral abdomen for the past 1 week. Happens every other day for about 1-2 hours. No aggravating or alleviating factors. Overall is pretty mild. Feels like it's superficial. No swelling. No /v. 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 Hang Lloyd at [facility]       Current Outpatient Medications   Medication Sig Dispense Refill    ibuprofen (ADVIL;MOTRIN) 600 MG tablet Take 1 tablet by mouth 4 times daily as needed for Pain 120 tablet 0     No current facility-administered medications for this visit. No Known Allergies    Review of Systems   No CP, no SOB, no rash, no bruise, no HA, no vision change, no ankle swelling, no hearing problems, no LAD      Vitals:  /80   Pulse 94   Wt 151 lb (68.5 kg)   SpO2 100%   BMI 25.13 kg/m²     Physical Exam   General:  Well-appearing, NAD, alert, non-toxic  HEENT:  Normocephalic, atraumatic. Pupils equal and round. TMs pearly with good landmarks. Moist mucous membranes. Normal dentition  NECK:  Supple, normal range of motion, no LAD, no meningeal signs, no JVD, nontender  CHEST/LUNGS: CTAB, no crackles, no wheeze, no rhonchi. Symmetric rise  CARDIOVASCULAR: RRR,  no murmur, no rub  ABDOMEN: Soft, non-tender, non-distended. No masses  EXTREMETIES: Normal movement of all extremities. No edema.  No joint swelling. SKIN:  No rash, no cellulitis, no bruising, no petechiae/purpura/vesicles/pustules/abscess  PSYCH:  A+O x 3; normal affect  NEURO:  GCS 15, CN2-12 grossly intact, no focal motor/sensory deficits, no cerebellar deficits, normal gait, normal speech      Assessment/Plan     7year-old male here for annual exam.  Tdap is up-to-date. Routine labs up-to-date. Vital signs are stable. Patient has history of high blood pressure. Blood pressure is normal off meds. We will continue to monitor in the future. Patient reports intermittent left abdominal pain. Likely musculoskeletal.  Recommend ibuprofen as needed. Discussed with patient medication/s dosage, instructions, potential S/E, A/R and Drug Interaction  Tylenol or Motrin as needed for pain or fever  Advise to return here if worse or go to nearest ER  Encourage fluids  Pt discharged in stable condition at 13:35      1. Annual physical exam      2. Abdominal pain, unspecified abdominal location    - ibuprofen (ADVIL;MOTRIN) 600 MG tablet; Take 1 tablet by mouth 4 times daily as needed for Pain  Dispense: 120 tablet; Refill: 0       No orders of the defined types were placed in this encounter. No follow-ups on file.     Gonzalez Pineda MD    5/14/2021  1:42 PM

## 2021-06-10 DIAGNOSIS — R10.9 ABDOMINAL PAIN, UNSPECIFIED ABDOMINAL LOCATION: ICD-10-CM

## 2021-06-10 NOTE — TELEPHONE ENCOUNTER
Medication:   Requested Prescriptions     Pending Prescriptions Disp Refills    ibuprofen (ADVIL;MOTRIN) 600 MG tablet [Pharmacy Med Name: IBUPROFEN 600 MG TABLET] 120 tablet 0     Sig: TAKE 1 TABLET BY MOUTH 4 TIMES DAILY AS NEEDED FOR PAIN        Last Filled:  05/14/21    Patient Phone Number: [phone] (home)     Last appt: 5/14/2021   Next appt: Visit date not found    Last OARRS: No flowsheet data found.

## 2021-06-11 RX ORDER — IBUPROFEN 600 MG/1
600 TABLET ORAL 4 TIMES DAILY PRN
Qty: 120 TABLET | Refills: 0 | Status: SHIPPED | OUTPATIENT
Start: 2021-06-11 | End: 2022-08-05

## 2021-09-17 ENCOUNTER — OFFICE VISIT (OUTPATIENT)
Dept: FAMILY MEDICINE CLINIC | Age: 26
End: 2021-09-17
Payer: COMMERCIAL

## 2021-09-17 VITALS
DIASTOLIC BLOOD PRESSURE: 88 MMHG | SYSTOLIC BLOOD PRESSURE: 130 MMHG | HEART RATE: 111 BPM | OXYGEN SATURATION: 100 % | WEIGHT: 149 LBS | BODY MASS INDEX: 24.79 KG/M2

## 2021-09-17 DIAGNOSIS — J02.9 SORE THROAT: ICD-10-CM

## 2021-09-17 DIAGNOSIS — R03.0 ELEVATED BLOOD PRESSURE READING WITHOUT DIAGNOSIS OF HYPERTENSION: ICD-10-CM

## 2021-09-17 DIAGNOSIS — M79.672 LEFT FOOT PAIN: ICD-10-CM

## 2021-09-17 DIAGNOSIS — Z23 NEED FOR VACCINATION: Primary | ICD-10-CM

## 2021-09-17 PROCEDURE — 90674 CCIIV4 VAC NO PRSV 0.5 ML IM: CPT | Performed by: FAMILY MEDICINE

## 2021-09-17 PROCEDURE — G8420 CALC BMI NORM PARAMETERS: HCPCS | Performed by: FAMILY MEDICINE

## 2021-09-17 PROCEDURE — 99214 OFFICE O/P EST MOD 30 MIN: CPT | Performed by: FAMILY MEDICINE

## 2021-09-17 PROCEDURE — G8427 DOCREV CUR MEDS BY ELIG CLIN: HCPCS | Performed by: FAMILY MEDICINE

## 2021-09-17 PROCEDURE — 1036F TOBACCO NON-USER: CPT | Performed by: FAMILY MEDICINE

## 2021-09-17 RX ORDER — NAPROXEN 500 MG/1
500 TABLET ORAL 2 TIMES DAILY WITH MEALS
Qty: 42 TABLET | Refills: 0 | Status: SHIPPED | OUTPATIENT
Start: 2021-09-17 | End: 2021-10-01

## 2021-09-17 NOTE — PROGRESS NOTES
[address] Note    Date: 9/17/2021                                               Dolph Prader:     Chief Complaint   Patient presents with    Blood Pressure Check     yesterday was 150/100     Toe Pain       HPI   L foot pain starting a few days ago. 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 Located over arch of foot and bottom of toes 2-4. No pain at rest.  Pt was at pharmacy yesterday and checked BP. Was 150. No CP or SOB. +sore throat starting this morning on waking. Resolved after an hour. No cough. No fever. 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 Charity Brooks at [facility]       Current Outpatient Medications   Medication Sig Dispense Refill    naproxen (NAPROSYN) 500 MG tablet Take 1 tablet by mouth 2 times daily (with meals) for 21 days 42 tablet 0    ibuprofen (ADVIL;MOTRIN) 600 MG tablet TAKE 1 TABLET BY MOUTH 4 TIMES DAILY AS NEEDED FOR PAIN 120 tablet 0     No current facility-administered medications for this visit. No Known Allergies    Review of Systems   No vomiting, no SOB    Vitals:  /88   Pulse 111   Wt 149 lb (67.6 kg)   SpO2 100%   BMI 24.79 kg/m²     Wt Readings from Last 3 Encounters:   09/17/21 149 lb (67.6 kg)   05/14/21 151 lb (68.5 kg)   01/04/21 150 lb (68 kg)        Physical Exam   General:  Well-appearing, NAD, alert, non-toxic  HEENT:  Normocephalic, atraumatic. Pupils equal and round. CHEST/LUNGS: CTAB, no crackles, no wheeze, no rhonchi.  Symmetric rise  CARDIOVASCULAR: RRR,  no murmur, no rub  EXTREMETIES: Normal movement of all extremities  SKIN:  No rash, no cellulitis, no bruising, no petechiae/purpura/vesicles/pustules/abscess  PSYCH:  A+O x 3; normal affect  NEURO:  GCS 15, CN2-12 grossly intact, no focal motor/sensory deficits, no cerebellar deficits, normal gait, normal speech      Assessment/Plan     49-year-old male with left foot pain. Likely tendon strain due to overuse. Recommend relative rest, ice, naproxen. Recommend stretches. No sign of fracture. Patient had elevated blood pressure yesterday. Is currently normotensive. No need for medications at this time. Continue to monitor. Patient has a sore throat started this morning. Likely acute viral URI. Recommend naproxen as needed. Follow-up if symptoms persist.    Discussed with patient medication/s dosage, instructions, potential S/E, A/R and Drug Interaction  Advise to return here if worse or go to nearest ER  Encourage fluids  Pt discharged in stable condition at 13:55      1. Need for vaccination    - INFLUENZA, MDCK QUADV, 2 YRS AND OLDER, IM, PF, PREFILL SYR OR SDV, 0.5ML (FLUCELVAX QUADV, PF)    2. Left foot pain    - naproxen (NAPROSYN) 500 MG tablet; Take 1 tablet by mouth 2 times daily (with meals) for 21 days  Dispense: 42 tablet; Refill: 0    3. Elevated blood pressure reading without diagnosis of hypertension      4. Sore throat        Orders Placed This Encounter   Procedures    INFLUENZA, MDCK QUADV, 2 YRS AND OLDER, IM, PF, PREFILL SYR OR SDV, 0.5ML (FLUCELVAX QUADV, PF)       Return if symptoms worsen or fail to improve.     Jaye Mckinney MD, MD    9/17/2021  1:57 PM

## 2021-09-17 NOTE — PATIENT INSTRUCTIONS
Patient Education        Plantar Fasciitis: Exercises  Introduction  Here are some examples of exercises for you to try. The exercises may be suggested for a condition or for rehabilitation. Start each exercise slowly. Ease off the exercises if you start to have pain. You will be told when to start these exercises and which ones will work best for you. How to do the exercises  Towel stretch   1. Sit with your legs extended and knees straight. 2. Place a towel around your foot just under the toes. 3. Hold each end of the towel in each hand, with your hands above your knees. 4. Pull back with the towel so that your foot stretches toward you. 5. Hold the position for at least 15 to 30 seconds. 6. Repeat 2 to 4 times a session, up to 5 sessions a day. Calf stretch   This exercise stretches the muscles at the back of the lower leg (the calf) and the Achilles tendon. Do this exercise 3 or 4 times a day, 5 days a week. 1. Stand facing a wall with your hands on the wall at about eye level. Put the leg you want to stretch about a step behind your other leg. 2. Keeping your back heel on the floor, bend your front knee until you feel a stretch in the back leg. 3. Hold the stretch for 15 to 30 seconds. Repeat 2 to 4 times. Plantar fascia and calf stretch   Stretching the plantar fascia and calf muscles can increase flexibility and decrease heel pain. You can do this exercise several times each day and before and after activity. 1. Stand on a step as shown above. Be sure to hold on to the banister. 2. Slowly let your heels down over the edge of the step as you relax your calf muscles. You should feel a gentle stretch across the bottom of your foot and up the back of your leg to your knee. 3. Hold the stretch about 15 to 30 seconds, and then tighten your calf muscle a little to bring your heel back up to the level of the step. Repeat 2 to 4 times.     Towel curls   Make this exercise more challenging by placing a weighted object, such as a soup can, on the other end of the towel. 1. While sitting, place your foot on a towel on the floor and scrunch the towel toward you with your toes. 2. Then, also using your toes, push the towel away from you. [place] pickups   1. Put marbles on the floor next to a cup.  2. Using your toes, try to lift the marbles up from the floor and put them in the cup. Follow-up care is a key part of your treatment and safety. Be sure to make and go to all appointments, and call your doctor if you are having problems. It's also a good idea to know your test results and keep a list of the medicines you take. Where can you learn more? Go to [email or web address] org and sign in to your uTaP account. Enter Y054 in the [email or web address] box to learn more about \"Plantar Fasciitis: Exercises. \"     If you do not have an account, please click on the \"Sign Up Now\" link. Current as of: November 16, 2020               Content Version: 12.9  © 9275-7188 [employer], [employer]. Care instructions adapted under license by [address] If you have questions about a medical condition or this instruction, always ask your healthcare professional. [address] any warranty or liability for your use of this information.

## 2021-10-01 DIAGNOSIS — M79.672 LEFT FOOT PAIN: ICD-10-CM

## 2021-10-01 RX ORDER — NAPROXEN 500 MG/1
500 TABLET ORAL 2 TIMES DAILY WITH MEALS
Qty: 42 TABLET | Refills: 0 | Status: SHIPPED | OUTPATIENT
Start: 2021-10-01 | End: 2022-08-05

## 2022-07-29 ENCOUNTER — NURSE TRIAGE (OUTPATIENT)
Dept: OTHER | Facility: CLINIC | Age: 27
End: 2022-07-29

## 2022-07-29 NOTE — TELEPHONE ENCOUNTER
Received call from Jossie at [facility] with Red Flag Complaint. Subjective: Caller states \"Few days having some chest pain and burning. I have acid reflux and stopped taking the medicine omeprazole. This feels like the acid reflux and I can feel it in my stomach after a small meal and feel full\"     Current Symptoms: Heartburn, acid reflux    Onset: a few days ago;     Associated Symptoms: NA    Pain Severity: 3/10; burning; intermittent after meals     Temperature:   Denies Fever    What has been tried: Nothing     LMP: NA Pregnant: NA     Recommended disposition:  callback by PCP within 1 hour    Care advice provided, patient verbalizes understanding; denies any other questions or concerns; instructed to call back for any new or worsening symptoms. Writer provided warm transfer to [facility] at [address] [place] [address] for Further care      Attention Provider: Thank you for allowing me to participate in the care of your patient. The patient was connected to triage in response to information provided to the E[facility]/PSC. Please do not respond through this encounter as the response is not directed to a shared pool.           Reason for Disposition   Patient says chest pain feels exactly the same as previously diagnosed 'heartburn' and describes burning in chest and accompanying sour taste in mouth    Protocols used: Chest Pain-ADULT-[place]

## 2022-08-05 ENCOUNTER — OFFICE VISIT (OUTPATIENT)
Dept: FAMILY MEDICINE CLINIC | Age: 27
End: 2022-08-05
Payer: COMMERCIAL

## 2022-08-05 VITALS
OXYGEN SATURATION: 97 % | DIASTOLIC BLOOD PRESSURE: 85 MMHG | SYSTOLIC BLOOD PRESSURE: 130 MMHG | WEIGHT: 158 LBS | HEIGHT: 65 IN | BODY MASS INDEX: 26.33 KG/M2 | HEART RATE: 85 BPM

## 2022-08-05 DIAGNOSIS — M79.601 ARM PAIN, RIGHT: ICD-10-CM

## 2022-08-05 DIAGNOSIS — K21.9 GASTROESOPHAGEAL REFLUX DISEASE, UNSPECIFIED WHETHER ESOPHAGITIS PRESENT: Primary | ICD-10-CM

## 2022-08-05 PROCEDURE — 99214 OFFICE O/P EST MOD 30 MIN: CPT | Performed by: FAMILY MEDICINE

## 2022-08-05 RX ORDER — OMEPRAZOLE 20 MG/1
20 CAPSULE, DELAYED RELEASE ORAL
Qty: 90 CAPSULE | Refills: 1 | Status: SHIPPED | OUTPATIENT
Start: 2022-08-05

## 2022-08-05 RX ORDER — METHYLPREDNISOLONE 4 MG/1
TABLET ORAL
Qty: 1 KIT | Refills: 0 | Status: SHIPPED | OUTPATIENT
Start: 2022-08-05

## 2022-08-05 SDOH — ECONOMIC STABILITY: FOOD INSECURITY: WITHIN THE PAST 12 MONTHS, YOU WORRIED THAT YOUR FOOD WOULD RUN OUT BEFORE YOU GOT MONEY TO BUY MORE.: NEVER TRUE

## 2022-08-05 SDOH — ECONOMIC STABILITY: FOOD INSECURITY: WITHIN THE PAST 12 MONTHS, THE FOOD YOU BOUGHT JUST DIDN'T LAST AND YOU DIDN'T HAVE MONEY TO GET MORE.: NEVER TRUE

## 2022-08-05 ASSESSMENT — PATIENT HEALTH QUESTIONNAIRE - PHQ9
SUM OF ALL RESPONSES TO PHQ QUESTIONS 1-9: 0
SUM OF ALL RESPONSES TO PHQ QUESTIONS 1-9: 0
1. LITTLE INTEREST OR PLEASURE IN DOING THINGS: 0
SUM OF ALL RESPONSES TO PHQ QUESTIONS 1-9: 0
2. FEELING DOWN, DEPRESSED OR HOPELESS: 0
SUM OF ALL RESPONSES TO PHQ9 QUESTIONS 1 & 2: 0
SUM OF ALL RESPONSES TO PHQ QUESTIONS 1-9: 0

## 2022-08-05 ASSESSMENT — SOCIAL DETERMINANTS OF HEALTH (SDOH): HOW HARD IS IT FOR YOU TO PAY FOR THE VERY BASICS LIKE FOOD, HOUSING, MEDICAL CARE, AND HEATING?: NOT VERY HARD

## 2022-08-05 NOTE — PROGRESS NOTES
[address] Note    Date: 8/5/2022                                               Orelia Gottron:     Chief Complaint   Patient presents with    Heartburn       HPI  Pt has concerns for heartburn happening for the last several months. Happens a few days a week, usually after eating. Was on Omeprazole in the past but he stopped it last year. Worked well. No vomiting.   +pain in R bicep for the past 3-4 days. No injury, but pt has been lifting a lot of boxes at work. No pain at rest, but hurts when he moves it. 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 Marleen Espinoza at [facility]       Current Outpatient Medications   Medication Sig Dispense Refill    methylPREDNISolone (MEDROL DOSEPACK) 4 MG tablet Take by mouth. 1 kit 0    omeprazole (PRILOSEC) 20 MG delayed release capsule Take 1 capsule by mouth every morning (before breakfast) 90 capsule 1     No current facility-administered medications for this visit. No Known Allergies    Review of Systems  No fever, no cough    Vitals:  /85   Pulse 85   Ht 5' 5\" (1.651 m)   Wt 158 lb (71.7 kg)   SpO2 97%   BMI 26.29 kg/m²     Wt Readings from Last 3 Encounters:   08/05/22 158 lb (71.7 kg)   09/17/21 149 lb (67.6 kg)   05/14/21 151 lb (68.5 kg)        Physical Exam  General:  Well-appearing, NAD, alert, non-toxic  HEENT:  Normocephalic, atraumatic. CHEST/LUNGS: No dyspnea  EXTREMETIES: Normal movement of all extremities. No edema. No joint swelling. SKIN:  No rash, no cellulitis, no bruising, no petechiae/purpura/vesicles/pustules/abscess  PSYCH:  A+O x 3; normal affect  NEURO:  GCS 15, CN2-12 grossly intact, no focal motor/sensory deficits, normal gait, normal speech      Assessment/Plan     31yo male with GERD.  Will

## 2023-03-27 ENCOUNTER — OFFICE VISIT (OUTPATIENT)
Dept: FAMILY MEDICINE CLINIC | Age: 28
End: 2023-03-27
Payer: COMMERCIAL

## 2023-03-27 ENCOUNTER — HOSPITAL ENCOUNTER (OUTPATIENT)
Age: 28
Discharge: HOME | End: 2023-03-27
Payer: COMMERCIAL

## 2023-03-27 VITALS
WEIGHT: 158 LBS | HEIGHT: 65 IN | RESPIRATION RATE: 16 BRPM | HEART RATE: 91 BPM | SYSTOLIC BLOOD PRESSURE: 122 MMHG | BODY MASS INDEX: 26.33 KG/M2 | DIASTOLIC BLOOD PRESSURE: 72 MMHG | OXYGEN SATURATION: 97 %

## 2023-03-27 DIAGNOSIS — Z00.00 ANNUAL PHYSICAL EXAM: Primary | ICD-10-CM

## 2023-03-27 DIAGNOSIS — K21.9 GASTROESOPHAGEAL REFLUX DISEASE, UNSPECIFIED WHETHER ESOPHAGITIS PRESENT: ICD-10-CM

## 2023-03-27 DIAGNOSIS — Z00.00 ANNUAL PHYSICAL EXAM: ICD-10-CM

## 2023-03-27 LAB
BASOPHILS # BLD: 0.1 K/UL (ref 0–0.2)
BASOPHILS NFR BLD: 0.6 %
DEPRECATED RDW RBC AUTO: 13.4 % (ref 12.4–15.4)
EOSINOPHIL # BLD: 0.1 K/UL (ref 0–0.6)
EOSINOPHIL NFR BLD: 1 %
HCT VFR BLD AUTO: 48.1 % (ref 40.5–52.5)
HGB BLD-MCNC: 16.3 G/DL (ref 13.5–17.5)
LYMPHOCYTES # BLD: 3 K/UL (ref 1–5.1)
LYMPHOCYTES NFR BLD: 30.6 %
MCH RBC QN AUTO: 30.7 PG (ref 26–34)
MCHC RBC AUTO-ENTMCNC: 33.9 G/DL (ref 31–36)
MCV RBC AUTO: 90.5 FL (ref 80–100)
MONOCYTES # BLD: 0.8 K/UL (ref 0–1.3)
MONOCYTES NFR BLD: 8.4 %
NEUTROPHILS # BLD: 5.7 K/UL (ref 1.7–7.7)
NEUTROPHILS NFR BLD: 59.4 %
PLATELET # BLD AUTO: 220 K/UL (ref 135–450)
PMV BLD AUTO: 10.3 FL (ref 5–10.5)
RBC # BLD AUTO: 5.31 M/UL (ref 4.2–5.9)
WBC # BLD AUTO: 9.7 K/UL (ref 4–11)

## 2023-03-27 PROCEDURE — 87390 HIV-1 AG IA: CPT

## 2023-03-27 PROCEDURE — 85025 COMPLETE CBC W/AUTO DIFF WBC: CPT

## 2023-03-27 PROCEDURE — 86702 HIV-2 ANTIBODY: CPT

## 2023-03-27 PROCEDURE — 80061 LIPID PANEL: CPT

## 2023-03-27 PROCEDURE — 86701 HIV-1ANTIBODY: CPT

## 2023-03-27 PROCEDURE — 86803 HEPATITIS C AB TEST: CPT

## 2023-03-27 PROCEDURE — 80053 COMPREHEN METABOLIC PANEL: CPT

## 2023-03-27 PROCEDURE — 99395 PREV VISIT EST AGE 18-39: CPT | Performed by: FAMILY MEDICINE

## 2023-03-27 PROCEDURE — 36415 COLL VENOUS BLD VENIPUNCTURE: CPT

## 2023-03-27 PROCEDURE — G8484 FLU IMMUNIZE NO ADMIN: HCPCS | Performed by: FAMILY MEDICINE

## 2023-03-27 PROCEDURE — 83036 HEMOGLOBIN GLYCOSYLATED A1C: CPT

## 2023-03-27 RX ORDER — OMEPRAZOLE 20 MG/1
20 CAPSULE, DELAYED RELEASE ORAL
Qty: 90 CAPSULE | Refills: 1 | Status: SHIPPED | OUTPATIENT
Start: 2023-03-27

## 2023-03-27 SDOH — ECONOMIC STABILITY: FOOD INSECURITY: WITHIN THE PAST 12 MONTHS, YOU WORRIED THAT YOUR FOOD WOULD RUN OUT BEFORE YOU GOT MONEY TO BUY MORE.: NEVER TRUE

## 2023-03-27 SDOH — ECONOMIC STABILITY: HOUSING INSECURITY
IN THE LAST 12 MONTHS, WAS THERE A TIME WHEN YOU DID NOT HAVE A STEADY PLACE TO SLEEP OR SLEPT IN A SHELTER (INCLUDING NOW)?: NO

## 2023-03-27 SDOH — ECONOMIC STABILITY: INCOME INSECURITY: HOW HARD IS IT FOR YOU TO PAY FOR THE VERY BASICS LIKE FOOD, HOUSING, MEDICAL CARE, AND HEATING?: NOT HARD AT ALL

## 2023-03-27 SDOH — ECONOMIC STABILITY: FOOD INSECURITY: WITHIN THE PAST 12 MONTHS, THE FOOD YOU BOUGHT JUST DIDN'T LAST AND YOU DIDN'T HAVE MONEY TO GET MORE.: NEVER TRUE

## 2023-03-27 ASSESSMENT — PATIENT HEALTH QUESTIONNAIRE - PHQ9
SUM OF ALL RESPONSES TO PHQ QUESTIONS 1-9: 0
2. FEELING DOWN, DEPRESSED OR HOPELESS: 0
SUM OF ALL RESPONSES TO PHQ9 QUESTIONS 1 & 2: 0
SUM OF ALL RESPONSES TO PHQ QUESTIONS 1-9: 0
SUM OF ALL RESPONSES TO PHQ QUESTIONS 1-9: 0
1. LITTLE INTEREST OR PLEASURE IN DOING THINGS: 0
SUM OF ALL RESPONSES TO PHQ QUESTIONS 1-9: 0

## 2023-03-27 NOTE — PROGRESS NOTES
30yo male here for annual exam. Tdap UTD. Check routine labs. VSS. Pt has GERD. Will treat with Omeprazole. Avoid offending foods. Discussed with patient medication/s dosage, instructions, potential S/E, A/R and Drug Interaction  Tylenol as needed for pain or fever  Advise to return here if worse or go to nearest ER  Encourage fluids  Pt discharged in stable condition at 15:05      1. Gastroesophageal reflux disease, unspecified whether esophagitis present  -     omeprazole (PRILOSEC) 20 MG delayed release capsule; Take 1 capsule by mouth every morning (before breakfast), Disp-90 capsule, R-1Normal       No orders of the defined types were placed in this encounter. No follow-ups on file.     Ross Lozoya MD, MD    3/27/2023  3:04 PM

## 2023-03-28 LAB
ALBUMIN SERPL-MCNC: 4.9 G/DL (ref 3.4–5)
ALBUMIN/GLOB SERPL: 1.5 {RATIO} (ref 1.1–2.2)
ALP SERPL-CCNC: 86 U/L (ref 40–129)
ALT SERPL-CCNC: 47 U/L (ref 10–40)
ANION GAP SERPL CALCULATED.3IONS-SCNC: 17 MMOL/L (ref 3–16)
AST SERPL-CCNC: 34 U/L (ref 15–37)
BILIRUB SERPL-MCNC: 0.7 MG/DL (ref 0–1)
BUN SERPL-MCNC: 11 MG/DL (ref 7–20)
CALCIUM SERPL-MCNC: 9.9 MG/DL (ref 8.3–10.6)
CHLORIDE SERPL-SCNC: 97 MMOL/L (ref 99–110)
CHOLEST SERPL-MCNC: 259 MG/DL (ref 0–199)
CO2 SERPL-SCNC: 24 MMOL/L (ref 21–32)
CREAT SERPL-MCNC: 1 MG/DL (ref 0.9–1.3)
EST. AVERAGE GLUCOSE BLD GHB EST-MCNC: 102.5 MG/DL
GFR SERPLBLD CREATININE-BSD FMLA CKD-EPI: >60 ML/MIN/{1.73_M2}
GLUCOSE SERPL-MCNC: 124 MG/DL (ref 70–99)
HBA1C MFR BLD: 5.2 %
HCV AB SERPL QL IA: NORMAL
HDLC SERPL-MCNC: 51 MG/DL (ref 40–60)
HIV 1+2 AB+HIV1 P24 AG SERPL QL IA: NORMAL
HIV 2 AB SERPL QL IA: NORMAL
HIV1 AB SERPL QL IA: NORMAL
HIV1 P24 AG SERPL QL IA: NORMAL
LDL CHOLESTEROL CALCULATED: ABNORMAL MG/DL
LDLC SERPL-MCNC: 171 MG/DL
POTASSIUM SERPL-SCNC: 4.3 MMOL/L (ref 3.5–5.1)
PROT SERPL-MCNC: 8.2 G/DL (ref 6.4–8.2)
SODIUM SERPL-SCNC: 138 MMOL/L (ref 136–145)
TRIGL SERPL-MCNC: 334 MG/DL (ref 0–150)
VLDLC SERPL CALC-MCNC: ABNORMAL MG/DL

## 2023-10-02 ENCOUNTER — OFFICE VISIT (OUTPATIENT)
Dept: FAMILY MEDICINE CLINIC | Age: 28
End: 2023-10-02
Payer: COMMERCIAL

## 2023-10-02 VITALS
SYSTOLIC BLOOD PRESSURE: 160 MMHG | DIASTOLIC BLOOD PRESSURE: 95 MMHG | RESPIRATION RATE: 16 BRPM | OXYGEN SATURATION: 98 % | WEIGHT: 157 LBS | TEMPERATURE: 97.3 F | HEIGHT: 65 IN | HEART RATE: 83 BPM | BODY MASS INDEX: 26.16 KG/M2

## 2023-10-02 DIAGNOSIS — Z23 NEED FOR VACCINATION: ICD-10-CM

## 2023-10-02 DIAGNOSIS — I10 HYPERTENSION, ESSENTIAL: Primary | ICD-10-CM

## 2023-10-02 PROCEDURE — 1036F TOBACCO NON-USER: CPT | Performed by: FAMILY MEDICINE

## 2023-10-02 PROCEDURE — 90471 IMMUNIZATION ADMIN: CPT | Performed by: FAMILY MEDICINE

## 2023-10-02 PROCEDURE — 90674 CCIIV4 VAC NO PRSV 0.5 ML IM: CPT | Performed by: FAMILY MEDICINE

## 2023-10-02 PROCEDURE — G8484 FLU IMMUNIZE NO ADMIN: HCPCS | Performed by: FAMILY MEDICINE

## 2023-10-02 PROCEDURE — 3080F DIAST BP >= 90 MM HG: CPT | Performed by: FAMILY MEDICINE

## 2023-10-02 PROCEDURE — G8427 DOCREV CUR MEDS BY ELIG CLIN: HCPCS | Performed by: FAMILY MEDICINE

## 2023-10-02 PROCEDURE — 99214 OFFICE O/P EST MOD 30 MIN: CPT | Performed by: FAMILY MEDICINE

## 2023-10-02 PROCEDURE — G8419 CALC BMI OUT NRM PARAM NOF/U: HCPCS | Performed by: FAMILY MEDICINE

## 2023-10-02 PROCEDURE — 3077F SYST BP >= 140 MM HG: CPT | Performed by: FAMILY MEDICINE

## 2023-10-02 RX ORDER — LISINOPRIL 20 MG/1
20 TABLET ORAL DAILY
Qty: 90 TABLET | Refills: 1 | Status: SHIPPED | OUTPATIENT
Start: 2023-10-02

## 2024-05-15 ENCOUNTER — OFFICE VISIT (OUTPATIENT)
Dept: FAMILY MEDICINE CLINIC | Age: 29
End: 2024-05-15
Payer: COMMERCIAL

## 2024-05-15 VITALS
TEMPERATURE: 97.8 F | SYSTOLIC BLOOD PRESSURE: 136 MMHG | WEIGHT: 156.8 LBS | DIASTOLIC BLOOD PRESSURE: 88 MMHG | HEART RATE: 97 BPM | OXYGEN SATURATION: 98 % | BODY MASS INDEX: 26.09 KG/M2

## 2024-05-15 DIAGNOSIS — R19.7 DIARRHEA OF PRESUMED INFECTIOUS ORIGIN: Primary | ICD-10-CM

## 2024-05-15 DIAGNOSIS — R07.81 RIB PAIN ON LEFT SIDE: ICD-10-CM

## 2024-05-15 PROCEDURE — G8419 CALC BMI OUT NRM PARAM NOF/U: HCPCS | Performed by: FAMILY MEDICINE

## 2024-05-15 PROCEDURE — G8427 DOCREV CUR MEDS BY ELIG CLIN: HCPCS | Performed by: FAMILY MEDICINE

## 2024-05-15 PROCEDURE — 1036F TOBACCO NON-USER: CPT | Performed by: FAMILY MEDICINE

## 2024-05-15 PROCEDURE — 99214 OFFICE O/P EST MOD 30 MIN: CPT | Performed by: FAMILY MEDICINE

## 2024-05-15 RX ORDER — NAPROXEN 500 MG/1
500 TABLET ORAL 2 TIMES DAILY WITH MEALS
Qty: 42 TABLET | Refills: 0 | Status: SHIPPED | OUTPATIENT
Start: 2024-05-15 | End: 2024-06-05

## 2024-05-15 SDOH — ECONOMIC STABILITY: FOOD INSECURITY: WITHIN THE PAST 12 MONTHS, THE FOOD YOU BOUGHT JUST DIDN'T LAST AND YOU DIDN'T HAVE MONEY TO GET MORE.: SOMETIMES TRUE

## 2024-05-15 SDOH — ECONOMIC STABILITY: FOOD INSECURITY: WITHIN THE PAST 12 MONTHS, YOU WORRIED THAT YOUR FOOD WOULD RUN OUT BEFORE YOU GOT MONEY TO BUY MORE.: SOMETIMES TRUE

## 2024-05-15 SDOH — ECONOMIC STABILITY: INCOME INSECURITY: HOW HARD IS IT FOR YOU TO PAY FOR THE VERY BASICS LIKE FOOD, HOUSING, MEDICAL CARE, AND HEATING?: SOMEWHAT HARD

## 2024-05-15 ASSESSMENT — PATIENT HEALTH QUESTIONNAIRE - PHQ9
SUM OF ALL RESPONSES TO PHQ9 QUESTIONS 1 & 2: 0
1. LITTLE INTEREST OR PLEASURE IN DOING THINGS: NOT AT ALL
SUM OF ALL RESPONSES TO PHQ QUESTIONS 1-9: 0
SUM OF ALL RESPONSES TO PHQ QUESTIONS 1-9: 0
2. FEELING DOWN, DEPRESSED OR HOPELESS: NOT AT ALL
SUM OF ALL RESPONSES TO PHQ QUESTIONS 1-9: 0
SUM OF ALL RESPONSES TO PHQ QUESTIONS 1-9: 0

## 2024-05-15 NOTE — PATIENT INSTRUCTIONS
Food Insecurity: Food Insecurity Present (5/15/2024)    Hunger Vital Sign     Worried About Running Out of Food in the Last Year: Sometimes true     Ran Out of Food in the Last Year: Sometimes true      Financial Resource Strain: Medium Risk (5/15/2024)    Overall Financial Resource Strain (CARDIA)     Difficulty of Paying Living Expenses: Somewhat hard

## 2024-06-03 DIAGNOSIS — R07.81 RIB PAIN ON LEFT SIDE: ICD-10-CM

## 2024-06-04 RX ORDER — NAPROXEN 500 MG/1
TABLET ORAL
Qty: 42 TABLET | Refills: 0 | Status: SHIPPED | OUTPATIENT
Start: 2024-06-04

## 2024-12-26 DIAGNOSIS — I10 HYPERTENSION, ESSENTIAL: ICD-10-CM

## 2024-12-26 RX ORDER — LISINOPRIL 20 MG/1
20 TABLET ORAL DAILY
Qty: 30 TABLET | Refills: 0 | Status: SHIPPED | OUTPATIENT
Start: 2024-12-26

## 2024-12-26 NOTE — TELEPHONE ENCOUNTER
Medication:   Requested Prescriptions     Pending Prescriptions Disp Refills    lisinopril (PRINIVIL;ZESTRIL) 20 MG tablet [Pharmacy Med Name: LISINOPRIL 20 MG TABLET] 90 tablet 1     Sig: TAKE 1 TABLET BY MOUTH EVERY DAY     Last Filled:  6/4/24    Last appt: 5/15/2024   Next appt: Visit date not found    Last OARRS:        No data to display